# Patient Record
Sex: MALE | Race: WHITE | URBAN - METROPOLITAN AREA
[De-identification: names, ages, dates, MRNs, and addresses within clinical notes are randomized per-mention and may not be internally consistent; named-entity substitution may affect disease eponyms.]

---

## 2017-01-03 ENCOUNTER — TELEPHONE (OUTPATIENT)
Dept: FAMILY MEDICINE | Facility: CLINIC | Age: 62
End: 2017-01-03

## 2017-01-03 NOTE — TELEPHONE ENCOUNTER
Called patient to notify him of valid GI referral written 3/23/16 and that he can call to schedule an appointment with them.   Unable to reach, left a VM to call back to RN triage line.     Chelo Vides RN  [facility]

## 2017-01-03 NOTE — TELEPHONE ENCOUNTER
Reason for Call: Request for an order or referral:    Referral being requested: [facility]    Date needed: at your convenience    Has the patient been seen by the PCP for this problem? YES    Additional comments: Patient states at last physical he was given referral but he always has to wait until work slows down in January through February. Can you resubmit referral so he can call to schedule.    Phone number Patient can be reached at:  Home number on file [phone] (home)    Best Time:  anytime    Can we leave a detailed message on this number?  YES    Call taken on 1/3/2017 at 8:22 AM by Clara Chaney

## 2017-01-03 NOTE — TELEPHONE ENCOUNTER
Patient called back. RN notified him that it was ok to call P GI as the referral should still be good.   Patient stated understanding.     Chelo Vides RN  [facility]

## 2017-01-05 DIAGNOSIS — B18.2 CHRONIC HEPATITIS C WITHOUT HEPATIC COMA (H): Primary | ICD-10-CM

## 2017-01-10 ENCOUNTER — OFFICE VISIT (OUTPATIENT)
Dept: GASTROENTEROLOGY | Facility: CLINIC | Age: 62
End: 2017-01-10
Attending: PHYSICIAN ASSISTANT
Payer: COMMERCIAL

## 2017-01-10 VITALS
WEIGHT: 180.3 LBS | SYSTOLIC BLOOD PRESSURE: 173 MMHG | DIASTOLIC BLOOD PRESSURE: 99 MMHG | TEMPERATURE: 97.7 F | BODY MASS INDEX: 25.81 KG/M2 | HEIGHT: 70 IN | OXYGEN SATURATION: 96 % | HEART RATE: 69 BPM

## 2017-01-10 DIAGNOSIS — B18.2 CHRONIC HEPATITIS C WITHOUT HEPATIC COMA (H): Primary | ICD-10-CM

## 2017-01-10 DIAGNOSIS — B18.2 CHRONIC HEPATITIS C WITHOUT HEPATIC COMA (H): ICD-10-CM

## 2017-01-10 DIAGNOSIS — I63.9 CEREBROVASCULAR ACCIDENT (CVA), UNSPECIFIED MECHANISM (H): ICD-10-CM

## 2017-01-10 DIAGNOSIS — Z95.2 S/P AVR (AORTIC VALVE REPLACEMENT): ICD-10-CM

## 2017-01-10 LAB
ALBUMIN SERPL-MCNC: 3.9 G/DL (ref 3.4–5)
ALP SERPL-CCNC: 60 U/L (ref 40–150)
ALT SERPL W P-5'-P-CCNC: 142 U/L (ref 0–70)
ANION GAP SERPL CALCULATED.3IONS-SCNC: 6 MMOL/L (ref 3–14)
AST SERPL W P-5'-P-CCNC: 83 U/L (ref 0–45)
BILIRUB DIRECT SERPL-MCNC: 0.1 MG/DL (ref 0–0.2)
BILIRUB SERPL-MCNC: 0.3 MG/DL (ref 0.2–1.3)
BUN SERPL-MCNC: 13 MG/DL (ref 7–30)
CALCIUM SERPL-MCNC: 10.1 MG/DL (ref 8.5–10.1)
CHLORIDE SERPL-SCNC: 105 MMOL/L (ref 94–109)
CO2 SERPL-SCNC: 28 MMOL/L (ref 20–32)
CREAT SERPL-MCNC: 0.89 MG/DL (ref 0.66–1.25)
ERYTHROCYTE [DISTWIDTH] IN BLOOD BY AUTOMATED COUNT: 13.1 % (ref 10–15)
GFR SERPL CREATININE-BSD FRML MDRD: 86 ML/MIN/1.7M2
GLUCOSE SERPL-MCNC: 97 MG/DL (ref 70–99)
HAV IGG SER QL IA: ABNORMAL
HBV CORE AB SERPL QL IA: NONREACTIVE
HBV SURFACE AB SERPL IA-ACNC: 17.35 M[IU]/ML
HBV SURFACE AG SERPL QL IA: NONREACTIVE
HCT VFR BLD AUTO: 43.1 % (ref 40–53)
HGB BLD-MCNC: 14.6 G/DL (ref 13.3–17.7)
INR PPP: 0.99 (ref 0.86–1.14)
IRON SATN MFR SERPL: 37 % (ref 15–46)
IRON SERPL-MCNC: 144 UG/DL (ref 35–180)
MCH RBC QN AUTO: 33 PG (ref 26.5–33)
MCHC RBC AUTO-ENTMCNC: 33.9 G/DL (ref 31.5–36.5)
MCV RBC AUTO: 98 FL (ref 78–100)
PLATELET # BLD AUTO: 223 10E9/L (ref 150–450)
POTASSIUM SERPL-SCNC: 4.4 MMOL/L (ref 3.4–5.3)
PROT SERPL-MCNC: 8.6 G/DL (ref 6.8–8.8)
RBC # BLD AUTO: 4.42 10E12/L (ref 4.4–5.9)
SODIUM SERPL-SCNC: 140 MMOL/L (ref 133–144)
TIBC SERPL-MCNC: 386 UG/DL (ref 240–430)
WBC # BLD AUTO: 6 10E9/L (ref 4–11)

## 2017-01-10 PROCEDURE — 86706 HEP B SURFACE ANTIBODY: CPT | Performed by: PHYSICIAN ASSISTANT

## 2017-01-10 PROCEDURE — 87340 HEPATITIS B SURFACE AG IA: CPT | Performed by: PHYSICIAN ASSISTANT

## 2017-01-10 PROCEDURE — 83550 IRON BINDING TEST: CPT | Performed by: PHYSICIAN ASSISTANT

## 2017-01-10 PROCEDURE — 80048 BASIC METABOLIC PNL TOTAL CA: CPT | Performed by: PHYSICIAN ASSISTANT

## 2017-01-10 PROCEDURE — 85027 COMPLETE CBC AUTOMATED: CPT | Performed by: PHYSICIAN ASSISTANT

## 2017-01-10 PROCEDURE — 86704 HEP B CORE ANTIBODY TOTAL: CPT | Performed by: PHYSICIAN ASSISTANT

## 2017-01-10 PROCEDURE — 86708 HEPATITIS A ANTIBODY: CPT | Performed by: PHYSICIAN ASSISTANT

## 2017-01-10 PROCEDURE — 87522 HEPATITIS C REVRS TRNSCRPJ: CPT | Performed by: PHYSICIAN ASSISTANT

## 2017-01-10 PROCEDURE — 80076 HEPATIC FUNCTION PANEL: CPT | Performed by: PHYSICIAN ASSISTANT

## 2017-01-10 PROCEDURE — 83540 ASSAY OF IRON: CPT | Performed by: PHYSICIAN ASSISTANT

## 2017-01-10 PROCEDURE — 87902 NFCT AGT GNTYP ALYS HEP C: CPT | Performed by: PHYSICIAN ASSISTANT

## 2017-01-10 PROCEDURE — 99212 OFFICE O/P EST SF 10 MIN: CPT | Mod: ZF

## 2017-01-10 PROCEDURE — 85610 PROTHROMBIN TIME: CPT | Performed by: PHYSICIAN ASSISTANT

## 2017-01-10 PROCEDURE — 36415 COLL VENOUS BLD VENIPUNCTURE: CPT | Performed by: PHYSICIAN ASSISTANT

## 2017-01-10 ASSESSMENT — PAIN SCALES - GENERAL: PAINLEVEL: NO PAIN (0)

## 2017-01-10 NOTE — PROGRESS NOTES
"Division of Gastroenterology, Hepatology and Nutrition Department of Medicine       Assessment/Plan:  Chronic Hepatitis C,  genotype pending         Marlo Cottrell  has chronic hepatitis C.  He has normal liver synthetic function labs including PLTs of 223  There is no stigmata of chronic liver disease.  His HCV genotype, quant,  and labs for Hepatitis A and B serology are pending.  The iron saturation is normal.  He  will schedule an US and Fibrosis Scan.   I will see the patient afterwards to discuss the results and start the Hepatitis C treatment process.       Thank you for allowing me to participate in the care of this patient.  If you have any questions regarding this visit and plan of care, please do not hesitate to page me at [phone].        Jodi Diana MS, PA-C  Division of Gastroenterology, Hepatology and Nutrition      HPI:    Marlo Cottrell is a pleasant 61 year old  male  who presents for consultation regarding chronic hepatitis C.  He is genotype unknown. He was diagnosed in the late 19990's.  His risk factors for hepatitis C are: IV drugs in the 1970's.  He last used in the 1970's.  He quit drinking alcohol in 2000.   He has  undergone a full  Evaluation for hepatitis C through [facility].   He  Had a liver biopsy in 2005 and doesn't know the results.  He did not go back there because his \"lung was punctured from the biopsy.\"   He also has been waiting for better treatment options.  He  is  treatment naive. This patient also has the following significant past medical history:   Patient Active Problem List   Diagnosis     Palpitations     Advanced directives, counseling/discussion     Aortic valve stenosis, severe     Hyperlipidemia LDL goal <100     Hypertension goal BP (blood pressure) < 140/90     CVA (cerebral vascular accident) (H)     S/P AVR (aortic valve replacement)     Chronic hepatitis C without hepatic coma (H)   .    The patient does not have ascites or any overt signs of " hepatic encephalopathy.  He  denies any hematemesis, hematochezia or melena.  He is negative for HIV, DM, inherited coagulation disorders, cryoglobulinemia,  chronic skin conditions, pulmonary disease (asthma, COPD, other). There is no family history of liver disease.      ROS:  10 point review of systems performed.  All pertinent positives noted in the HPI,  otherwise Negative.      Past Medical History   Diagnosis Date     Hepatitis C      Hyperlipidemia      Aortic stenosis      Walking troubles      Difficulty in walking(719.7)      Chronic infection      hepatitis C       Current Outpatient Prescriptions   Medication Sig Dispense Refill     metoprolol (LOPRESSOR) 25 MG tablet Take 0.5 tablets (12.5 mg) by mouth 2 times daily 90 tablet 3     lisinopril (PRINIVIL,ZESTRIL) 2.5 MG tablet Take 1 tablet (2.5 mg) by mouth daily 90 tablet 3     ASPIRIN 325 MG OR TBEC 1 TABLET DAILY         No Known Allergies    Family History   Problem Relation Age of Onset     CANCER Mother      lung     Alzheimer Disease Paternal Grandfather      CANCER Brother      maybe lung     DIABETES No family hx of      C.A.D. No family hx of      CEREBROVASCULAR DISEASE No family hx of        Social History     Social History     Marital Status: Single     Spouse Name: N/A     Number of Children: 1     Years of Education: N/A     Occupational History            [employer], shipping/ receiving     Social History Main Topics     Smoking status: Former Smoker     Types: Cigarettes     Quit date: 01/01/2005     Smokeless tobacco: Never Used      Comment: non-smoking household     Alcohol Use: No      Comment: none since 2000     Drug Use: No      Comment: never     Sexual Activity:     Partners: Female     Other Topics Concern     Parent/Sibling W/ Cabg, Mi Or Angioplasty Before 65f 55m? No     Social History Narrative       OBJECTIVE  Vitals: Blood pressure 173/99, pulse 69, temperature 97.7  F (36.5  C), temperature source Oral, height 1.778 m  "(5' 10\"), weight 81.784 kg (180 lb 4.8 oz), SpO2 96 %. Body mass index is 25.87 kg/(m^2).  Gen: No acute distress, well nourished  Head: Normocephalic atraumatic  Eyes: Sclera anicteric  Respiratory: Clear to auscultation bilaterally, no overt wheezing or rales  CV: RRR with overt murmur  Abdomen: Soft, nontender, nondistended, normal bowel sounds  Without appreciable hepatosplenomegaly or masses  Extrem: No edema  Skin: No jaundice, spider angiomata or palmar erythema.  No rashes.   Neuro: Alert and oriented.   MSK: Grossly Intact  Psych: Normal speech, normal affect    Recent Laboratory Test Results  Lab Results   Component Value Date    ALBUMIN 3.9 01/10/2017    BILITOTAL 0.3 01/10/2017    ALKPHOS 60 01/10/2017    AST 83* 01/10/2017    * 01/10/2017    WBC 6.0 01/10/2017    ANEU 2.9 03/23/2016    HGB 14.6 01/10/2017     01/10/2017    INR 0.99 01/10/2017    TRIG 71 03/23/2016    CR 0.89 01/10/2017    TSH 3.34 04/29/2015               "

## 2017-01-10 NOTE — NURSING NOTE
Chief Complaint   Patient presents with     Consult     Hepatitis C   Pt roomed, vitals, meds, and allergies reviewed with pt. Pt ready for provider.  Macho Ulloa, CMA

## 2017-01-10 NOTE — Clinical Note
"1/10/2017       RE: Marlo Cottrell  [address] [facility] [address]     Dear Colleague,    Thank you for referring your patient, Marlo Cottrell, to the [facility] HEPATOLOGY at [facility]. Please see a copy of my visit note below.    Division of Gastroenterology, Hepatology and Nutrition Department of Medicine       Assessment/Plan:  Chronic Hepatitis C,  genotype pending         Marlo Cottrell  has chronic hepatitis C.  He has normal liver synthetic function labs including PLTs of 223  There is no stigmata of chronic liver disease.  His HCV genotype, quant,  and labs for Hepatitis A and B serology are pending.  The iron saturation is normal.  He  will schedule an US and Fibrosis Scan.   I will see the patient afterwards to discuss the results and start the Hepatitis C treatment process.       Thank you for allowing me to participate in the care of this patient.  If you have any questions regarding this visit and plan of care, please do not hesitate to page me at [phone].        Jodi Diana MS, PA-C  Division of Gastroenterology, Hepatology and Nutrition      HPI:    Marlo Cottrell is a pleasant 61 year old  male  who presents for consultation regarding chronic hepatitis C.  He is genotype unknown. He was diagnosed in the late 19990's.  His risk factors for hepatitis C are: IV drugs in the 1970's.  He last used in the 1970's.  He quit drinking alcohol in 2000.   He has  undergone a full  Evaluation for hepatitis C through [facility].   He  Had a liver biopsy in 2005 and doesn't know the results.  He did not go back there because his \"lung was punctured from the biopsy.\"   He also has been waiting for better treatment options.  He  is  treatment naive. This patient also has the following significant past medical history:   Patient Active Problem List   Diagnosis     Palpitations     Advanced directives, counseling/discussion     Aortic valve stenosis, severe "     Hyperlipidemia LDL goal <100     Hypertension goal BP (blood pressure) < 140/90     CVA (cerebral vascular accident) (H)     S/P AVR (aortic valve replacement)     Chronic hepatitis C without hepatic coma (H)   .    The patient does not have ascites or any overt signs of hepatic encephalopathy.  He  denies any hematemesis, hematochezia or melena.  He is negative for HIV, DM, inherited coagulation disorders, cryoglobulinemia,  chronic skin conditions, pulmonary disease (asthma, COPD, other). There is no family history of liver disease.      ROS:  10 point review of systems performed.  All pertinent positives noted in the HPI,  otherwise Negative.      Past Medical History   Diagnosis Date     Hepatitis C      Hyperlipidemia      Aortic stenosis      Walking troubles      Difficulty in walking(719.7)      Chronic infection      hepatitis C       Current Outpatient Prescriptions   Medication Sig Dispense Refill     metoprolol (LOPRESSOR) 25 MG tablet Take 0.5 tablets (12.5 mg) by mouth 2 times daily 90 tablet 3     lisinopril (PRINIVIL,ZESTRIL) 2.5 MG tablet Take 1 tablet (2.5 mg) by mouth daily 90 tablet 3     ASPIRIN 325 MG OR TBEC 1 TABLET DAILY         No Known Allergies    Family History   Problem Relation Age of Onset     CANCER Mother      lung     Alzheimer Disease Paternal Grandfather      CANCER Brother      maybe lung     DIABETES No family hx of      C.A.D. No family hx of      CEREBROVASCULAR DISEASE No family hx of        Social History     Social History     Marital Status: Single     Spouse Name: N/A     Number of Children: 1     Years of Education: N/A     Occupational History            [employer], shipping/ receiving     Social History Main Topics     Smoking status: Former Smoker     Types: Cigarettes     Quit date: 01/01/2005     Smokeless tobacco: Never Used      Comment: non-smoking household     Alcohol Use: No      Comment: none since 2000     Drug Use: No      Comment: never     Sexual  "Activity:     Partners: Female     Other Topics Concern     Parent/Sibling W/ Cabg, Mi Or Angioplasty Before 65f 55m? No     Social History Narrative       OBJECTIVE  Vitals: Blood pressure 173/99, pulse 69, temperature 97.7  F (36.5  C), temperature source Oral, height 1.778 m (5' 10\"), weight 81.784 kg (180 lb 4.8 oz), SpO2 96 %. Body mass index is 25.87 kg/(m^2).  Gen: No acute distress, well nourished  Head: Normocephalic atraumatic  Eyes: Sclera anicteric  Respiratory: Clear to auscultation bilaterally, no overt wheezing or rales  CV: RRR with overt murmur  Abdomen: Soft, nontender, nondistended, normal bowel sounds  Without appreciable hepatosplenomegaly or masses  Extrem: No edema  Skin: No jaundice, spider angiomata or palmar erythema.  No rashes.   Neuro: Alert and oriented.   MSK: Grossly Intact  Psych: Normal speech, normal affect    Recent Laboratory Test Results  Lab Results   Component Value Date    ALBUMIN 3.9 01/10/2017    BILITOTAL 0.3 01/10/2017    ALKPHOS 60 01/10/2017    AST 83* 01/10/2017    * 01/10/2017    WBC 6.0 01/10/2017    ANEU 2.9 03/23/2016    HGB 14.6 01/10/2017     01/10/2017    INR 0.99 01/10/2017    TRIG 71 03/23/2016    CR 0.89 01/10/2017    TSH 3.34 04/29/2015       Again, thank you for allowing me to participate in the care of your patient.      Sincerely,    Jodi Diana PA-C      "

## 2017-01-10 NOTE — MR AVS SNAPSHOT
After Visit Summary   1/10/2017    Marlo Cottrell    MRN: [id number]           Patient Information     Date Of Birth          1955        Visit Information        Provider Department      1/10/2017 1:00 PM Jodi Diana PA-C [facility] Hepatology        Today's Diagnoses     Chronic hepatitis C without hepatic coma (H)    -  1     S/P AVR (aortic valve replacement)         Cerebrovascular accident (CVA), unspecified mechanism (H)            Follow-ups after your visit        Your next 10 appointments already scheduled     Feb 01, 2017 10:30 AM   US ABDOMEN COMPLETE with UCUS2   [facility] US ([facility])    [address] [place] [id number] [phone]           Please bring a list of your medicines (including vitamins, minerals and over-the-counter drugs). Also, tell your doctor about any allergies you may have. Wear comfortable clothes and leave your valuables at home.  Adults: No eating or drinking for 8 hours before the exam. You may take medicine with a small sip of water.  Children: - Children 6+ years: No food or drink for 6 hours before exam. - Children 1-5 years: No food or drink for 4 hours before exam. - Infants, breast-fed: may have breast milk up to 2 hours before exam. - Infants, formula: may have bottle until 4 hours before exam.  Please call the Imaging Department at your exam site with any questions.            Feb 01, 2017 11:00 AM   (Arrive by 10:45 AM)   FIBROSIS SCAN with DEVI Mendoza [facility] Hepatology ([facility])    [address] [place] [address] [phone]            Feb 01, 2017 12:30 PM   (Arrive by 12:15 PM)   FIBROSIS SCAN READ with DEVI Mendoza [facility] Hepatology ([facility])    [address] [place] [address] [phone]             "  Future tests that were ordered for you today     Open Future Orders        Priority Expected Expires Ordered    US Abdomen Complete Routine  7/9/2018 1/10/2017    Fibrosis Scan (In-Clinic) Routine  1/10/2018 1/10/2017            Who to contact     If you have questions or need follow up information about today's clinic visit or your schedule please contact [facility] HEPATOLOGY directly at [phone].  Normal or non-critical lab and imaging results will be communicated to you by [employer], letter or phone within 4 business days after the clinic has received the results. If you do not hear from us within 7 days, please contact the clinic through [employer] or phone. If you have a critical or abnormal lab result, we will notify you by phone as soon as possible.  Submit refill requests through [employer] or call your pharmacy and they will forward the refill request to us. Please allow 3 business days for your refill to be completed.          Additional Information About Your Visit        [employer] Information     [employer] gives you secure access to your electronic health record. If you see a primary care provider, you can also send messages to your care team and make appointments. If you have questions, please call your primary care clinic.  If you do not have a primary care provider, please call [phone] and they will assist you.        Care EveryWhere ID     This is your Care EveryWhere ID. This could be used by other organizations to access your [place] medical records  [id number]        Your Vitals Were     Pulse Temperature Height BMI (Body Mass Index) Pulse Oximetry       69 97.7  F (36.5  C) (Oral) 1.778 m (5' 10\") 25.87 kg/m2 96%        Blood Pressure from Last 3 Encounters:   01/10/17 173/99   09/08/16 154/78   03/23/16 134/76    Weight from Last 3 Encounters:   01/10/17 81.784 kg (180 lb 4.8 oz)   09/08/16 79.425 kg (175 lb 1.6 oz)   04/22/16 82.555 kg (182 lb)               Primary Care Provider Office Phone " # Fax #    Quang Morgan [phone]       [facility] [address] [facility] [address]        Thank you!     Thank you for choosing [facility] HEPATOLOGY  for your care. Our goal is always to provide you with excellent care. Hearing back from our patients is one way we can continue to improve our services. Please take a few minutes to complete the written survey that you may receive in the mail after your visit with us. Thank you!             Your Updated Medication List - Protect others around you: Learn how to safely use, store and throw away your medicines at www.disposemymeds.org.          This list is accurate as of: 1/10/17  1:36 PM.  Always use your most recent med list.                   Brand Name Dispense Instructions for use    aspirin 325 MG EC tablet      1 TABLET DAILY       lisinopril 2.5 MG tablet    PRINIVIL/Zestril    90 tablet    Take 1 tablet (2.5 mg) by mouth daily       metoprolol 25 MG tablet    LOPRESSOR    90 tablet    Take 0.5 tablets (12.5 mg) by mouth 2 times daily

## 2017-01-12 LAB
HCV RNA SERPL NAA+PROBE-ACNC: ABNORMAL [IU]/ML
HCV RNA SERPL NAA+PROBE-LOG IU: 5.9 LOG IU/ML

## 2017-01-14 LAB — HCV GENTYP SERPL NAA+PROBE: NORMAL

## 2017-02-01 ENCOUNTER — OFFICE VISIT (OUTPATIENT)
Dept: GASTROENTEROLOGY | Facility: CLINIC | Age: 62
End: 2017-02-01
Attending: PHYSICIAN ASSISTANT
Payer: COMMERCIAL

## 2017-02-01 VITALS
BODY MASS INDEX: 25.77 KG/M2 | HEIGHT: 70 IN | DIASTOLIC BLOOD PRESSURE: 89 MMHG | OXYGEN SATURATION: 97 % | HEART RATE: 85 BPM | WEIGHT: 180 LBS | SYSTOLIC BLOOD PRESSURE: 144 MMHG | TEMPERATURE: 98.2 F

## 2017-02-01 DIAGNOSIS — B18.2 CHRONIC HEPATITIS C WITHOUT HEPATIC COMA (H): ICD-10-CM

## 2017-02-01 DIAGNOSIS — B18.2 CHRONIC HEPATITIS C WITHOUT HEPATIC COMA (H): Primary | ICD-10-CM

## 2017-02-01 PROCEDURE — 99212 OFFICE O/P EST SF 10 MIN: CPT | Mod: ZF

## 2017-02-01 PROCEDURE — 91200 LIVER ELASTOGRAPHY: CPT | Mod: ZF | Performed by: PHYSICIAN ASSISTANT

## 2017-02-01 ASSESSMENT — PAIN SCALES - GENERAL: PAINLEVEL: NO PAIN (0)

## 2017-02-01 NOTE — Clinical Note
"2/1/2017      RE: Marlo Cottrell  [address] [facility] [address]       Division of Gastroenterology, Hepatology and Nutrition Department of Medicine     Assessment/Plan  Chronic Hepatitis C,  genotype 1a Stage 2 fibrosis (Fibrosis scan 2/1/17)         Marlo Cottrell  has chronic hepatitis C.  He had a fibrosis scan today that shows he has stage 2 fibrosis.  The US today shows,\" The liver demonstrates normal homogeneous echotexture. No evidence of a focal hepatic mass.  There is a tiny echogenic focus in the gallbladder most consistent with focal adenomyomatosis.\"    There is no family history of gallbladder cancer and Mr. Cottrell denies any abdominal pain, weight loss.    It is imperative to treat the Hepatitis C.  Mr. Cottrell would like to participate in the Prioritize Study.  I will see him  back 4 weeks after he  has started the medication.  He  will be randomized to either Harvoni or Zepatier.        Thank you for allowing me to participate in the care of this patient.  If you have any questions regarding this visit and plan of care, please do not hesitate to page me at [phone].    Jodi Diana, MS, PA-C  Division of Gastroenterology, Hepatology and Nutrition      HPI  Marlo Cottrell is a pleasant 61 year old  male  who presents for consultation regarding chronic hepatitis C.  He is genotype unknown. He was diagnosed in the late 19990's.  His risk factors for hepatitis C are: IV drugs in the 1970's.  He last used in the 1970's.  He quit drinking alcohol in 2000.   He has  undergone a full  Evaluation for hepatitis C through [facility].   He  Had a liver biopsy in 2005 and doesn't know the results.  He did not go back there because his \"lung was punctured from the biopsy.\"   He also has been waiting for better treatment options.  He  is  treatment naive. This patient also has the following significant past medical history:   Patient Active Problem List   Diagnosis     Palpitations     Advanced " "directives, counseling/discussion     Aortic valve stenosis, severe     Hyperlipidemia LDL goal <100     Hypertension goal BP (blood pressure) < 140/90     CVA (cerebral vascular accident) (H)     S/P AVR (aortic valve replacement)     Chronic hepatitis C without hepatic coma (H)         ROS:  Comprehensive review of systems is negative, unless otherwise noted above    Past Medical History   Diagnosis Date     Hepatitis C      Hyperlipidemia      Aortic stenosis      Walking troubles      Difficulty in walking(719.7)      Chronic infection      hepatitis C       Current Outpatient Prescriptions   Medication Sig Dispense Refill     metoprolol (LOPRESSOR) 25 MG tablet Take 0.5 tablets (12.5 mg) by mouth 2 times daily 90 tablet 3     lisinopril (PRINIVIL,ZESTRIL) 2.5 MG tablet Take 1 tablet (2.5 mg) by mouth daily 90 tablet 3     ASPIRIN 325 MG OR TBEC 1 TABLET DAILY         No Known Allergies    Family History   Problem Relation Age of Onset     CANCER Mother      lung     Alzheimer Disease Paternal Grandfather      CANCER Brother      maybe lung     DIABETES No family hx of      C.A.D. No family hx of      CEREBROVASCULAR DISEASE No family hx of        Social History     Social History     Marital Status: Single     Spouse Name: N/A     Number of Children: 1     Years of Education: N/A     Occupational History            [employer], [employer]/ receiving     Social History Main Topics     Smoking status: Former Smoker     Types: Cigarettes     Quit date: 01/01/2005     Smokeless tobacco: Never Used      Comment: non-smoking household     Alcohol Use: No      Comment: none since 2000     Drug Use: No      Comment: never     Sexual Activity:     Partners: Female     Other Topics Concern     Parent/Sibling W/ Cabg, Mi Or Angioplasty Before 65f 55m? No     Social History Narrative       OBJECTIVE  Vitals: Blood pressure 144/89, pulse 85, temperature 98.2  F (36.8  C), temperature source Oral, height 1.778 m (5' 10\"), weight " 81.647 kg (180 lb), SpO2 97 %. Body mass index is 25.83 kg/(m^2).  Gen: No acute distress, well nourished  Eyes: Sclera anicteric  Respiratory: Clear to auscultation bilaterally, no overt wheezing or rales  CV: RRR without overt murmur  Skin: No rash; no jaundice  Psych: Normal speech, normal affect    Recent Laboratory Test Results  Lab Results   Component Value Date    WBC 6.0 01/10/2017    HGB 14.6 01/10/2017    HCT 43.1 01/10/2017     01/10/2017    CHOL 134 03/23/2016    TRIG 71 03/23/2016    HDL 26* 03/23/2016    * 01/10/2017    AST 83* 01/10/2017     01/10/2017    BUN 13 01/10/2017    CO2 28 01/10/2017    TSH 3.34 04/29/2015    PSA 0.20 04/29/2015    INR 0.99 01/10/2017       Jodi Diana PA-C

## 2017-02-01 NOTE — PROGRESS NOTES
"Division of Gastroenterology, Hepatology and Nutrition Department of Medicine     Assessment/Plan  Chronic Hepatitis C,  genotype 1a Stage 2 fibrosis (Fibrosis scan 2/1/17)         Marlo Cottrell  has chronic hepatitis C.  He had a fibrosis scan today that shows he has stage 2 fibrosis.  The US today shows,\" The liver demonstrates normal homogeneous echotexture. No evidence of a focal hepatic mass.  There is a tiny echogenic focus in the gallbladder most consistent with focal adenomyomatosis.\"    There is no family history of gallbladder cancer and Mr. Cottrell denies any abdominal pain, weight loss.    It is imperative to treat the Hepatitis C.  Mr. Cottrell would like to participate in the Prioritize Study.  I will see him  back 4 weeks after he  has started the medication.  He  will be randomized to either Harvoni or Zepatier.        Thank you for allowing me to participate in the care of this patient.  If you have any questions regarding this visit and plan of care, please do not hesitate to page me at [phone].    Jodi Diana MS, PA-C  Division of Gastroenterology, Hepatology and Nutrition      HPI  Marlo Cottrell is a pleasant 61 year old  male  who presents for consultation regarding chronic hepatitis C.  He is genotype unknown. He was diagnosed in the late 19990's.  His risk factors for hepatitis C are: IV drugs in the 1970's.  He last used in the 1970's.  He quit drinking alcohol in 2000.   He has  undergone a full  Evaluation for hepatitis C through [facility].   He  Had a liver biopsy in 2005 and doesn't know the results.  He did not go back there because his \"lung was punctured from the biopsy.\"   He also has been waiting for better treatment options.  He  is  treatment naive. This patient also has the following significant past medical history:   Patient Active Problem List   Diagnosis     Palpitations     Advanced directives, counseling/discussion     Aortic valve stenosis, severe     " "Hyperlipidemia LDL goal <100     Hypertension goal BP (blood pressure) < 140/90     CVA (cerebral vascular accident) (H)     S/P AVR (aortic valve replacement)     Chronic hepatitis C without hepatic coma (H)         ROS:  Comprehensive review of systems is negative, unless otherwise noted above    Past Medical History   Diagnosis Date     Hepatitis C      Hyperlipidemia      Aortic stenosis      Walking troubles      Difficulty in walking(719.7)      Chronic infection      hepatitis C       Current Outpatient Prescriptions   Medication Sig Dispense Refill     metoprolol (LOPRESSOR) 25 MG tablet Take 0.5 tablets (12.5 mg) by mouth 2 times daily 90 tablet 3     lisinopril (PRINIVIL,ZESTRIL) 2.5 MG tablet Take 1 tablet (2.5 mg) by mouth daily 90 tablet 3     ASPIRIN 325 MG OR TBEC 1 TABLET DAILY         No Known Allergies    Family History   Problem Relation Age of Onset     CANCER Mother      lung     Alzheimer Disease Paternal Grandfather      CANCER Brother      maybe lung     DIABETES No family hx of      C.A.D. No family hx of      CEREBROVASCULAR DISEASE No family hx of        Social History     Social History     Marital Status: Single     Spouse Name: N/A     Number of Children: 1     Years of Education: N/A     Occupational History            [employer], [employer]/ receiving     Social History Main Topics     Smoking status: Former Smoker     Types: Cigarettes     Quit date: 01/01/2005     Smokeless tobacco: Never Used      Comment: non-smoking household     Alcohol Use: No      Comment: none since 2000     Drug Use: No      Comment: never     Sexual Activity:     Partners: Female     Other Topics Concern     Parent/Sibling W/ Cabg, Mi Or Angioplasty Before 65f 55m? No     Social History Narrative       OBJECTIVE  Vitals: Blood pressure 144/89, pulse 85, temperature 98.2  F (36.8  C), temperature source Oral, height 1.778 m (5' 10\"), weight 81.647 kg (180 lb), SpO2 97 %. Body mass index is 25.83 kg/(m^2).  Gen: " No acute distress, well nourished  Eyes: Sclera anicteric  Respiratory: Clear to auscultation bilaterally, no overt wheezing or rales  CV: RRR without overt murmur  Skin: No rash; no jaundice  Psych: Normal speech, normal affect    Recent Laboratory Test Results  Lab Results   Component Value Date    WBC 6.0 01/10/2017    HGB 14.6 01/10/2017    HCT 43.1 01/10/2017     01/10/2017    CHOL 134 03/23/2016    TRIG 71 03/23/2016    HDL 26* 03/23/2016    * 01/10/2017    AST 83* 01/10/2017     01/10/2017    BUN 13 01/10/2017    CO2 28 01/10/2017    TSH 3.34 04/29/2015    PSA 0.20 04/29/2015    INR 0.99 01/10/2017

## 2017-03-03 ENCOUNTER — CARE COORDINATION (OUTPATIENT)
Dept: GASTROENTEROLOGY | Facility: CLINIC | Age: 62
End: 2017-03-03

## 2017-03-03 DIAGNOSIS — B18.2 CHRONIC HEPATITIS C WITHOUT HEPATIC COMA (H): Primary | ICD-10-CM

## 2017-03-03 NOTE — PROGRESS NOTES
3/3/2017  1:14 PM    Hep C Care Coordination Call   Connected with patient for f/u on Hep C treatment start. Patient will also be on the prioritized study. Patient was randomized for Harvoni x 12 weeks. Patient did start treatment on 2/27/17. Patient reports no changes in health, no new prescribed medications and has taken the medication as prescribed. Patient states he has not spoken with a pharmacist and would like to speak with one regarding his bp medications. I did set up an appt for patient with Chito GUTIERREZ PharmD on 3/7/17 at 1pm. Reviewed the following information with patient;       Below is a summary of your treatment schedule. Please follow the schedule as closely as possible. Labs should be drawn as close to the date indicated at the [facility] or [facility].    Hepatitis C Treatment  Treatment: Harvoni x 12 weeks  Treatment Naive, GT 1a, F2       Start Date: 02/27/17    Week 4  Hepatic panel, bmp Lab Due: 3/27/2017  Office Visit Date:  With Jodi Diana PA-C on 3/28/17 at 11:00am at the [facility] located at [address] [place] [address] on the 3rd floor. Please arrive at 10:00am to have your labs drawn on first floor of the same building. You do not need to fast for these labs.     Week 12 - End of Treatment  HCV RNA Quant Lab Due: 5/22/2017. Patient will have this lab drawn at the [facility] at 10:00am on the first floor. You do not need to fast for this lab.      3 Months Post Treatment  HCV RNA Quant Lab Due: 8/21/2017. Patient will have this lab drawn at the [facility] at 10:00am on the first floor. You do not need to fast for this lab.        Educational information to patient on Hep C treatment;     -Contact the [facility] Hepatology clinic and speak with RN Care Coordinator prior to starting any new prescribed or OTC medications.   -Take medications exactly as prescribed, do not change dose or stop taking without consulting your provider.   -Take Medication one time each  day with or without food  -If you miss a dose of medication, then take it as soon as you remember on the same day. If not remembered on the same day, then skip the dose and take the next dose at the usual time. Do not take more than the recommended dose. Contact the clinic if you miss a dose.    Please contact the pharmacy 1-2 weeks prior to needing a medication refill.      Side Effects  The most common side effects of Hep C medication treatment can include:  -tiredness  -headache  Notify the clinic of any side effects that bother you or that do not go away.   Possible side effects have been discussed.   Patient has been instructed to clinic for rash, itching or unmanageable nausea.    How to store Hep C Treatment Medications  -Store Medication at room temperature below 86 degrees F  -Keep Medication in it's original container  -Do not use Medication if the seal is broken or missing    General information  It is not known if treatment will prevent you from infecting another person or reinfecting yourself with the hepatitis C virus during treatment. It is best that as soon as you start treatment to buy a new toothbrush, disposable razors (if you use a rotating shaver you do not need to buy a new one) and nail clippers. If you check your blood sugar at home, please dispose of the fingerstick needle after each use and DO NOT REUSE the insulin needles. These items should not be shared with anyone.        If you have any questions, please contact the main clinic at [phone] or your RN Care Coordinator at [phone]. We appreciate you choosing the [facility] clinic for your treatment. Patient agrees to Hep C treatment POC and verbalizes understanding. Patient will receive a copy of treatment plan in the mail, address verified with patient. Patient has no further questions or concerns. Hep C care team updated on patient status.            Shannan Mendoza RN, BSN, PHN  [facility]  Care Coordinator for Hepatology Clinic/Specialty Program

## 2017-03-07 ENCOUNTER — ALLIED HEALTH/NURSE VISIT (OUTPATIENT)
Dept: PHARMACY | Facility: CLINIC | Age: 62
End: 2017-03-07
Payer: COMMERCIAL

## 2017-03-07 DIAGNOSIS — B18.2 CHRONIC HEPATITIS C WITHOUT HEPATIC COMA (H): Primary | ICD-10-CM

## 2017-03-07 PROCEDURE — 99207 ZZC NO CHARGE LOS: CPT | Performed by: PHARMACIST

## 2017-03-07 RX ORDER — CALCIUM CARBONATE 500 MG/1
1 TABLET, CHEWABLE ORAL DAILY PRN
COMMUNITY

## 2017-03-07 NOTE — MR AVS SNAPSHOT
After Visit Summary   3/7/2017    Marlo Cottrell    MRN: [id number]           Patient Information     Date Of Birth          1955        Visit Information        Provider Department      3/7/2017 1:00 PM Chito Fisher, [facility] Medication Therapy Management        Today's Diagnoses     Chronic hepatitis C without hepatic coma (H)    -  1      Care Instructions    Recommendations from today's MT visit:                                                      Concerning Harvoni Therapy:   -You will be taking Harvoni, 1 tablet daily for 12 weeks with or without food.   -Do NOT take heartburn/reflux medications while on Harvoni without discussing with us first   -If you start any new medications, please call to discuss drug interactions with me before starting   -If you miss a dose, and it has been less than 12 hours, you may take the dose. If it has been more, skip the dose and take your next dose as normal. Do not take 2 doses at the same time.    -Most common side effects are Headache and Fatigue.    Next  visit: as needed    To schedule another MTM appointment, please call the clinic directly or you may call the MTM scheduling line at [phone] or toll-free at [phone].     My Clinical Pharmacist's contact information:                                                      It was a pleasure seeing you today!  Please feel free to contact me with any questions or concerns you have.      Chito Fisher, PharmD  MT Pharmacist    Phone: [phone]     You may receive a survey about the MT services you received.  I would appreciate your feedback to help me serve you better in the future. Please fill it out and return it when you can. Your comments will be anonymous.            Follow-ups after your visit        Your next 10 appointments already scheduled     Mar 28, 2017 10:00 AM CDT   LAB with [facility] ([facility])    [address] [place] [address]  [facility] [id number] [phone]           Patient must bring picture ID.  Patient should be prepared to give a urine specimen  Please do not eat 10-12 hours before your appointment if you are coming in fasting for labs on lipids, cholesterol, or glucose (sugar).  Pregnant women should follow their Care Team instructions. Water with medications is okay. Do not drink coffee or other fluids.   If you have concerns about taking  your medications, please ask at office or if scheduling via [employer], send a message by clicking on Secure Messaging, Message Your Care Team.            Mar 28, 2017 11:00 AM CDT   (Arrive by 10:45 AM)   Return General Liver with Jodi Diana PA-C   [facility] ([facility])    [address] [place] [id number]   [phone]            May 22, 2017 10:00 AM CDT   LAB with  LAB   [facility] ([facility])    [address] [place] [id number] [phone]           Patient must bring picture ID.  Patient should be prepared to give a urine specimen  Please do not eat 10-12 hours before your appointment if you are coming in fasting for labs on lipids, cholesterol, or glucose (sugar).  Pregnant women should follow their Care Team instructions. Water with medications is okay. Do not drink coffee or other fluids.   If you have concerns about taking  your medications, please ask at office or if scheduling via [employer], send a message by clicking on Secure Messaging, Message Your Care Team.            Aug 21, 2017 10:00 AM CDT   LAB with  LAB    Health Lab ([facility])    [address] [place] [id number] [phone]           Patient must bring picture ID.  Patient should be prepared to give a urine specimen  Please do not eat 10-12 hours before your appointment if you are coming in fasting for labs on lipids,  cholesterol, or glucose (sugar).  Pregnant women should follow their Care Team instructions. Water with medications is okay. Do not drink coffee or other fluids.   If you have concerns about taking  your medications, please ask at office or if scheduling via [employer], send a message by clicking on Secure Messaging, Message Your Care Team.              Who to contact     If you have questions or need follow up information about today's clinic visit or your schedule please contact [facility] MEDICATION THERAPY MANAGEMENT directly at No information on file..  Normal or non-critical lab and imaging results will be communicated to you by [employer], letter or phone within 4 business days after the clinic has received the results. If you do not hear from us within 7 days, please contact the clinic through [employer] or phone. If you have a critical or abnormal lab result, we will notify you by phone as soon as possible.  Submit refill requests through [employer] or call your pharmacy and they will forward the refill request to us. Please allow 3 business days for your refill to be completed.          Additional Information About Your Visit        [employer] Information     [employer] gives you secure access to your electronic health record. If you see a primary care provider, you can also send messages to your care team and make appointments. If you have questions, please call your primary care clinic.  If you do not have a primary care provider, please call [phone] and they will assist you.        Care EveryWhere ID     This is your Care EveryWhere ID. This could be used by other organizations to access your [facility] medical records  [id number]         Blood Pressure from Last 3 Encounters:   02/01/17 144/89   01/10/17 (!) 173/99   09/08/16 154/78    Weight from Last 3 Encounters:   02/01/17 180 lb (81.6 kg)   01/10/17 180 lb 4.8 oz (81.8 kg)   09/08/16 175 lb 1.6 oz (79.4 kg)              Today, you had the following     No orders  found for display       Primary Care Provider Office Phone # Fax #    Quang Morgan [phone]       [facility] [address] [facility] [address]        Thank you!     Thank you for choosing [facility] MEDICATION THERAPY MANAGEMENT  for your care. Our goal is always to provide you with excellent care. Hearing back from our patients is one way we can continue to improve our services. Please take a few minutes to complete the written survey that you may receive in the mail after your visit with us. Thank you!             Your Updated Medication List - Protect others around you: Learn how to safely use, store and throw away your medicines at www.disposemymeds.org.          This list is accurate as of: 3/7/17  1:18 PM.  Always use your most recent med list.                   Brand Name Dispense Instructions for use    AMOXICILLIN PO      Take 2,000 mg by mouth Before dental appointments.       aspirin 325 MG EC tablet      1 TABLET DAILY       calcium carbonate 500 MG chewable tablet    TUMS     Take 1 chew tab by mouth daily as needed       lisinopril 2.5 MG tablet    PRINIVIL/Zestril    90 tablet    Take 1 tablet (2.5 mg) by mouth daily       metoprolol 25 MG tablet    LOPRESSOR    90 tablet    Take 0.5 tablets (12.5 mg) by mouth 2 times daily

## 2017-03-07 NOTE — PATIENT INSTRUCTIONS
Recommendations from today's MTM visit:                                                      Concerning Harvoni Therapy:   -You will be taking Harvoni, 1 tablet daily for 12 weeks with or without food.   -Do NOT take heartburn/reflux medications while on Harvoni without discussing with us first   -If you start any new medications, please call to discuss drug interactions with me before starting   -If you miss a dose, and it has been less than 12 hours, you may take the dose. If it has been more, skip the dose and take your next dose as normal. Do not take 2 doses at the same time.    -Most common side effects are Headache and Fatigue.    Next  visit: as needed    To schedule another MTM appointment, please call the clinic directly or you may call the MTM scheduling line at [phone] or toll-free at [phone].     My Clinical Pharmacist's contact information:                                                      It was a pleasure seeing you today!  Please feel free to contact me with any questions or concerns you have.      Chito Fisher, Britney  MTM Pharmacist    Phone: [phone]     You may receive a survey about the MTM services you received.  I would appreciate your feedback to help me serve you better in the future. Please fill it out and return it when you can. Your comments will be anonymous.

## 2017-03-07 NOTE — PROGRESS NOTES
-[facility] Use Only -  Started taking on 2/27/17. No missed doses/ side effects. Taking at 5PM every day after work.   Genotype: 1a  Viral Load and date drawn: 700,000 on 1/1/17  Previous treatment: Treatment naive  Liver staging: F2 on 2/1/17 via fibroscan  Child Ford score: N/A  HIV positive: No  Renal impairment CrCl <30mL/min: No  Decompensated Cirrhosis: No  Recurrent HCV post-liver transplant: No  Hepatocellular Carcinoma: No   Initial Regimen: Harvoni (ledipasvir/sofosbuvir)  Length of Therapy Ordered: 12 weeks  DDIx with HCV therapy: Tums- has been  4 hours before and after.   MTM Evaluation: HCV therapy appropriate and Length of therapy appropriate Pt is appropriately avoiding Tums around dose time.     Discussed with patient:  1. Missed dose protocol  2. Common Side effects  3. DDIx with Tums.       Chito Fisher, PharmD  MTM Pharmacist    Phone: [phone]

## 2017-03-24 ENCOUNTER — OFFICE VISIT (OUTPATIENT)
Dept: FAMILY MEDICINE | Facility: CLINIC | Age: 62
End: 2017-03-24
Payer: COMMERCIAL

## 2017-03-24 VITALS
TEMPERATURE: 99.1 F | HEART RATE: 68 BPM | DIASTOLIC BLOOD PRESSURE: 80 MMHG | WEIGHT: 182 LBS | BODY MASS INDEX: 26.05 KG/M2 | SYSTOLIC BLOOD PRESSURE: 131 MMHG | HEIGHT: 70 IN

## 2017-03-24 DIAGNOSIS — Z79.2 PROPHYLACTIC ANTIBIOTIC: ICD-10-CM

## 2017-03-24 DIAGNOSIS — Z12.11 SCREEN FOR COLON CANCER: Primary | ICD-10-CM

## 2017-03-24 DIAGNOSIS — Z00.00 ROUTINE GENERAL MEDICAL EXAMINATION AT A HEALTH CARE FACILITY: ICD-10-CM

## 2017-03-24 DIAGNOSIS — I10 HYPERTENSION GOAL BP (BLOOD PRESSURE) < 140/90: ICD-10-CM

## 2017-03-24 DIAGNOSIS — Z13.1 SCREENING FOR DIABETES MELLITUS: ICD-10-CM

## 2017-03-24 DIAGNOSIS — N52.9 ERECTILE DYSFUNCTION, UNSPECIFIED ERECTILE DYSFUNCTION TYPE: ICD-10-CM

## 2017-03-24 DIAGNOSIS — Z12.5 SCREENING FOR PROSTATE CANCER: ICD-10-CM

## 2017-03-24 DIAGNOSIS — Z13.6 CARDIOVASCULAR SCREENING; LDL GOAL LESS THAN 100: ICD-10-CM

## 2017-03-24 LAB
CHOLEST SERPL-MCNC: 211 MG/DL
GLUCOSE BLD-MCNC: 140 MG/DL (ref 70–99)
HDLC SERPL-MCNC: 42 MG/DL
LDLC SERPL CALC-MCNC: 158 MG/DL
NONHDLC SERPL-MCNC: 169 MG/DL
PSA SERPL-ACNC: 0.35 UG/L (ref 0–4)
TRIGL SERPL-MCNC: 57 MG/DL

## 2017-03-24 PROCEDURE — 82947 ASSAY GLUCOSE BLOOD QUANT: CPT | Performed by: FAMILY MEDICINE

## 2017-03-24 PROCEDURE — 36415 COLL VENOUS BLD VENIPUNCTURE: CPT | Performed by: FAMILY MEDICINE

## 2017-03-24 PROCEDURE — 80061 LIPID PANEL: CPT | Performed by: FAMILY MEDICINE

## 2017-03-24 PROCEDURE — 99396 PREV VISIT EST AGE 40-64: CPT | Performed by: FAMILY MEDICINE

## 2017-03-24 PROCEDURE — G0103 PSA SCREENING: HCPCS | Performed by: FAMILY MEDICINE

## 2017-03-24 RX ORDER — LISINOPRIL 2.5 MG/1
2.5 TABLET ORAL DAILY
Qty: 90 TABLET | Refills: 3 | Status: SHIPPED | OUTPATIENT
Start: 2017-03-24 | End: 2018-03-12

## 2017-03-24 RX ORDER — SILDENAFIL 50 MG/1
25-50 TABLET, FILM COATED ORAL DAILY PRN
Qty: 6 TABLET | Refills: 3 | Status: SHIPPED | OUTPATIENT
Start: 2017-03-24 | End: 2018-04-20

## 2017-03-24 RX ORDER — AMOXICILLIN 500 MG/1
CAPSULE ORAL
Qty: 12 CAPSULE | Refills: 1 | Status: SHIPPED | OUTPATIENT
Start: 2017-03-24 | End: 2018-03-26

## 2017-03-24 RX ORDER — LEDIPASVIR AND SOFOSBUVIR 90; 400 MG/1; MG/1
TABLET, FILM COATED ORAL
COMMUNITY
Start: 2017-03-08 | End: 2017-12-29

## 2017-03-24 RX ORDER — METOPROLOL TARTRATE 25 MG/1
12.5 TABLET, FILM COATED ORAL 2 TIMES DAILY
Qty: 90 TABLET | Refills: 3 | Status: SHIPPED | OUTPATIENT
Start: 2017-03-24 | End: 2018-04-20

## 2017-03-24 ASSESSMENT — PAIN SCALES - GENERAL: PAINLEVEL: NO PAIN (0)

## 2017-03-24 NOTE — PROGRESS NOTES
SUBJECTIVE:     CC: Marlo Cottrell is an 61 year old male who presents for preventative health visit.     Physical   Annual:     Getting at least 3 servings of Calcium per day::  Yes    Bi-annual eye exam::  NO    Dental care twice a year::  Yes    Sleep apnea or symptoms of sleep apnea::  None    Diet::  Regular (no restrictions) and Low salt    Frequency of exercise::  2-3 days/week    Duration of exercise::  15-30 minutes    Taking medications regularly::  Yes    Medication side effects::  None    Additional concerns today::  No          none    Today's PHQ-2 Score:   PHQ-2 ( 1999 [employer]) 3/22/2017   Q1: Little interest or pleasure in doing things -   Q2: Feeling down, depressed or hopeless -   PHQ-2 Score -   Little interest or pleasure in doing things Not at all   Feeling down, depressed or hopeless Not at all   PHQ-2 Score 0       Abuse: Current or Past(Physical, Sexual or Emotional)- No  Do you feel safe in your environment - Yes    Social History   Substance Use Topics     Smoking status: Former Smoker     Types: Cigarettes     Quit date: 1/1/2005     Smokeless tobacco: Never Used      Comment: non-smoking household     Alcohol use No      Comment: none since 2000     The patient does not drink >3 drinks per day nor >7 drinks per week.    Last PSA:   PSA   Date Value Ref Range Status   04/29/2015 0.20 0 - 4 ug/L Final       Recent Labs   Lab Test  03/23/16   0804  04/29/15   0803  04/11/14   0858   CHOL  134  154  155   HDL  26*  28*  26*   LDL  94  112  113   TRIG  71  68  76   CHOLHDLRATIO   --   5.5*  5.9*   NHDL  108   --    --        Reviewed orders with patient. Reviewed health maintenance and updated orders accordingly - Yes    Reviewed and updated as needed this visit by clinical staff  Tobacco  Allergies  Meds  Problems  Med Hx  Surg Hx  Fam Hx  Soc Hx          Reviewed and updated as needed this visit by Provider            ROS:  C: NEGATIVE for fever, chills, change in weight  I: NEGATIVE  for worrisome rashes, moles or lesions  E: NEGATIVE for vision changes or irritation  ENT: NEGATIVE for ear, mouth and throat problems  R: NEGATIVE for significant cough or SOB  CV: NEGATIVE for chest pain, palpitations or peripheral edema  GI: NEGATIVE for nausea, abdominal pain, heartburn, or change in bowel habits   male: negative for dysuria, hematuria, decreased urinary stream, erectile dysfunction, urethral discharge  M: NEGATIVE for significant arthralgias or myalgia  N: NEGATIVE for weakness, dizziness or paresthesias  P: NEGATIVE for changes in mood or affect    4 weeks into treatment for hep c    Feeling well     Walking a lot for exercise      Daughter Clary in good health    Needs amox for dental visits    Will likely start having intercourse soon in new relationship    Wanting viagra type med    Has hammertoe; has inserts for shoes    Never had eye exam        OBJECTIVE:     /80 (BP Location: Left arm, Patient Position: Chair, Cuff Size: Adult Regular)  Pulse 68  Temp 99.1  F (37.3  C) (Oral)  Wt 184 lb (83.5 kg)  BMI 26.4 kg/m2  EXAM:  GENERAL: healthy, alert and no distress  HENT: ear canals and TM's normal, nose and mouth without ulcers or lesions  NECK: no adenopathy, no asymmetry, masses, or scars and thyroid normal to palpation  RESP: lungs clear to auscultation - no rales, rhonchi or wheezes  CV: regular rate and rhythm, normal S1 S2, no S3 or S4, no murmur, click or rub, no peripheral edema and peripheral pulses strong  ABDOMEN: soft, nontender, no hepatosplenomegaly, no masses and bowel sounds normal   (male): normal male genitalia without lesions or urethral discharge, no hernia  RECTAL: normal sphincter tone, no rectal masses, prostate normal size, smooth, nontender without nodules or masses  MS: no gross musculoskeletal defects noted, no edema  SKIN: no suspicious lesions or rashes  NEURO: Normal strength and tone, mentation intact and speech normal  PSYCH: mentation appears  normal, affect normal/bright    ASSESSMENT/PLAN:     Marlo was seen today for physical, health maintenance and *_* health care directive *_*.    Diagnoses and all orders for this visit:    Screen for colon cancer  -     Fecal colorectal cancer screen (FIT); Future    Routine general medical examination at a health care facility    Hypertension goal BP (blood pressure) < 140/90  -     metoprolol (LOPRESSOR) 25 MG tablet; Take 0.5 tablets (12.5 mg) by mouth 2 times daily  -     lisinopril (PRINIVIL/ZESTRIL) 2.5 MG tablet; Take 1 tablet (2.5 mg) by mouth daily    Prophylactic antibiotic  -     amoxicillin (AMOXIL) 500 MG capsule; Before dental appointments take 4 pills one hour prior    Erectile dysfunction, unspecified erectile dysfunction type  -     sildenafil (REVATIO/VIAGRA) 50 MG cap/tab; Take 0.5-1 tablets (25-50 mg) by mouth daily as needed for erectile dysfunction Take 30 min to 4 hours before intercourse.  Never use with nitroglycerin, terazosin or doxazosin.    CARDIOVASCULAR SCREENING; LDL GOAL LESS THAN 100  -     Lipid panel reflex to direct LDL    Screening for diabetes mellitus  -     Glucose whole blood    Screening for prostate cancer  -     Prostate spec antigen screen    Other orders  -     Cancel: Lipid panel reflex to direct LDL    overall patient doing well  On hep c treatment currently  He has quite a few labs done for this; we just did a few others  Refill meds  Keep working on healthy diet/exercise   Prescribe viagra; discussed in detail  Do fit test      COUNSELING:   Reviewed preventive health counseling, as reflected in patient instructions       Regular exercise       Healthy diet/nutrition       Vision screening       Hearing screening       Safe sex practices/STD prevention       Colon cancer screening       Prostate cancer screening         reports that he quit smoking about 12 years ago. His smoking use included Cigarettes. He has never used smokeless tobacco.    Estimated body mass  "index is 26.4 kg/(m^2) as calculated from the following:    Height as of 2/1/17: 5' 10\" (1.778 m).    Weight as of this encounter: 184 lb (83.5 kg).   Weight management plan: Discussed healthy diet and exercise guidelines and patient will follow up in 12 months in clinic to re-evaluate.    Counseling Resources:  ATP IV Guidelines  Pooled Cohorts Equation Calculator  FRAX Risk Assessment  ICSI Preventive Guidelines  Dietary Guidelines for Americans, 2010  USDA's MyPlate  ASA Prophylaxis  Lung CA Screening    Quang Morgan MD  [facility]  Answers for HPI/ROS submitted by the patient on 3/22/2017   Q1: Little interest or pleasure in doing things: 0=Not at all  Q2: Feeling down, depressed or hopeless: 0=Not at all  PHQ-2 Score: 0    "

## 2017-03-24 NOTE — PATIENT INSTRUCTIONS
Keep working on healthy diet/exercise     We will send you lab results    Return the stool card test    Call/ return to clinic with problems/ questions

## 2017-03-24 NOTE — NURSING NOTE
"Chief Complaint   Patient presents with     Physical     Health Maintenance     *_* Health Care Directive *_*       Initial /80 (BP Location: Left arm, Patient Position: Chair, Cuff Size: Adult Regular)  Pulse 68  Temp 99.1  F (37.3  C) (Oral)  Wt 184 lb (83.5 kg)  BMI 26.4 kg/m2 Estimated body mass index is 26.4 kg/(m^2) as calculated from the following:    Height as of 2/1/17: 5' 10\" (1.778 m).    Weight as of this encounter: 184 lb (83.5 kg).  Medication Reconciliation: complete   Hillary Orta CMA      "

## 2017-03-24 NOTE — MR AVS SNAPSHOT
After Visit Summary   3/24/2017    Marlo Cottrell    MRN: [id number]           Patient Information     Date Of Birth          1955        Visit Information        Provider Department      3/24/2017 7:20 AM Quang Morgan MD [facility]        Today's Diagnoses     Screen for colon cancer    -  1    Hypertension goal BP (blood pressure) < 140/90        Prophylactic antibiotic        Erectile dysfunction, unspecified erectile dysfunction type        CARDIOVASCULAR SCREENING; LDL GOAL LESS THAN 100        Screening for diabetes mellitus        Screening for prostate cancer          Care Instructions    Keep working on healthy diet/exercise     We will send you lab results    Return the stool card test    Call/ return to clinic with problems/ questions        Follow-ups after your visit        Your next 10 appointments already scheduled     Mar 28, 2017 10:00 AM CDT   LAB with  LAB   [facility] ([facility])    [address] [place] [id number] [phone]           Patient must bring picture ID.  Patient should be prepared to give a urine specimen  Please do not eat 10-12 hours before your appointment if you are coming in fasting for labs on lipids, cholesterol, or glucose (sugar).  Pregnant women should follow their Care Team instructions. Water with medications is okay. Do not drink coffee or other fluids.   If you have concerns about taking  your medications, please ask at office or if scheduling via [email or web address], send a message by clicking on Secure Messaging, Message Your Care Team.            Mar 28, 2017 11:00 AM CDT   (Arrive by 10:45 AM)   Return General Liver with Jodi Diana PA-C   [facility] ([facility])    [address] [place] [id number] [phone]            May 22, 2017 10:00 AM CDT   LAB with  LAB    [facility] ([facility])    [address] [id number] [phone]           Patient must bring picture ID.  Patient should be prepared to give a urine specimen  Please do not eat 10-12 hours before your appointment if you are coming in fasting for labs on lipids, cholesterol, or glucose (sugar).  Pregnant women should follow their Care Team instructions. Water with medications is okay. Do not drink coffee or other fluids.   If you have concerns about taking  your medications, please ask at office or if scheduling via Adcast, send a message by clicking on Secure Messaging, Message Your Care Team.            Aug 21, 2017 10:00 AM CDT   LAB with  LAB   [facility] ([facility])    [address] [id number] [phone]           Patient must bring picture ID.  Patient should be prepared to give a urine specimen  Please do not eat 10-12 hours before your appointment if you are coming in fasting for labs on lipids, cholesterol, or glucose (sugar).  Pregnant women should follow their Care Team instructions. Water with medications is okay. Do not drink coffee or other fluids.   If you have concerns about taking  your medications, please ask at office or if scheduling via Adcast, send a message by clicking on Secure Messaging, Message Your Care Team.              Future tests that were ordered for you today     Open Future Orders        Priority Expected Expires Ordered    Fecal colorectal cancer screen (FIT) Routine 4/13/2017 6/15/2017 3/24/2017            Who to contact     If you have questions or need follow up information about today's clinic visit or your schedule please contact [facility] directly at [phone].  Normal or non-critical lab and imaging results will be communicated to you by MyChart, letter or phone within 4 business days after the clinic has received the results. If you do not hear  "from us within 7 days, please contact the clinic through Dartfish or phone. If you have a critical or abnormal lab result, we will notify you by phone as soon as possible.  Submit refill requests through Dartfish or call your pharmacy and they will forward the refill request to us. Please allow 3 business days for your refill to be completed.          Additional Information About Your Visit        [employer] Electronic Cigarette Information     Dartfish gives you secure access to your electronic health record. If you see a primary care provider, you can also send messages to your care team and make appointments. If you have questions, please call your primary care clinic.  If you do not have a primary care provider, please call [phone] and they will assist you.        Care EveryWhere ID     This is your Care EveryWhere ID. This could be used by other organizations to access your [facility] medical records  [id number]        Your Vitals Were     Pulse Temperature Height BMI (Body Mass Index)          68 99.1  F (37.3  C) (Oral) 5' 10\" (1.778 m) 26.11 kg/m2         Blood Pressure from Last 3 Encounters:   03/24/17 131/80   02/01/17 144/89   01/10/17 (!) 173/99    Weight from Last 3 Encounters:   03/24/17 182 lb (82.6 kg)   02/01/17 180 lb (81.6 kg)   01/10/17 180 lb 4.8 oz (81.8 kg)              We Performed the Following     Glucose whole blood     Lipid panel reflex to direct LDL     Prostate spec antigen screen          Today's Medication Changes          These changes are accurate as of: 3/24/17  8:08 AM.  If you have any questions, ask your nurse or doctor.               Start taking these medicines.        Dose/Directions    sildenafil 50 MG cap/tab   Commonly known as:  REVATIO/VIAGRA   Used for:  Erectile dysfunction, unspecified erectile dysfunction type   Started by:  Quang Morgan MD        Dose:  25-50 mg   Take 0.5-1 tablets (25-50 mg) by mouth daily as needed for erectile dysfunction Take 30 min to 4 hours before " intercourse.  Never use with nitroglycerin, terazosin or doxazosin.   Quantity:  6 tablet   Refills:  3         These medicines have changed or have updated prescriptions.        Dose/Directions    amoxicillin 500 MG capsule   Commonly known as:  AMOXIL   This may have changed:    - medication strength  - how much to take  - how to take this  - additional instructions   Used for:  Prophylactic antibiotic   Changed by:  Quang Morgan MD        Before dental appointments take 4 pills one hour prior   Quantity:  12 capsule   Refills:  1            Where to get your medicines      These medications were sent to [facility]/pharmacy #[id number] - [place], [place] - [facility] OF [address] [place] [place] [address] [address]     Phone:  [phone]     amoxicillin 500 MG capsule    lisinopril 2.5 MG tablet    metoprolol 25 MG tablet         Some of these will need a paper prescription and others can be bought over the counter.  Ask your nurse if you have questions.     Bring a paper prescription for each of these medications     sildenafil 50 MG cap/tab                Primary Care Provider Office Phone # Fax #    Quang Morgan [phone]       [facility] [address] [facility] [address]        Thank you!     Thank you for choosing [facility]  for your care. Our goal is always to provide you with excellent care. Hearing back from our patients is one way we can continue to improve our services. Please take a few minutes to complete the written survey that you may receive in the mail after your visit with us. Thank you!             Your Updated Medication List - Protect others around you: Learn how to safely use, store and throw away your medicines at www.disposemymeds.org.          This list is accurate as of: 3/24/17  8:08 AM.  Always use your most recent med list.                   Brand Name Dispense Instructions for use    amoxicillin 500 MG  capsule    AMOXIL    12 capsule    Before dental appointments take 4 pills one hour prior       aspirin 325 MG EC tablet      1 TABLET DAILY       calcium carbonate 500 MG chewable tablet    TUMS     Take 1 chew tab by mouth daily as needed       ledipasvir-sofosbuvir  MG per tablet   Generic drug:  ledipasvir-sofosbuvir          lisinopril 2.5 MG tablet    PRINIVIL/Zestril    90 tablet    Take 1 tablet (2.5 mg) by mouth daily       metoprolol 25 MG tablet    LOPRESSOR    90 tablet    Take 0.5 tablets (12.5 mg) by mouth 2 times daily       sildenafil 50 MG cap/tab    REVATIO/VIAGRA    6 tablet    Take 0.5-1 tablets (25-50 mg) by mouth daily as needed for erectile dysfunction Take 30 min to 4 hours before intercourse.  Never use with nitroglycerin, terazosin or doxazosin.

## 2017-03-26 NOTE — PROGRESS NOTES
The cholesterol is moderately high, and the blood sugar is mildly elevated.    Advise seeing us 2-3 months after finishing the hepatitis C medicine.   We can recheck labs at that time.    Prostate blood test is fine.    Quang Morgan MD

## 2017-03-28 ENCOUNTER — OFFICE VISIT (OUTPATIENT)
Dept: GASTROENTEROLOGY | Facility: CLINIC | Age: 62
End: 2017-03-28
Attending: PHYSICIAN ASSISTANT
Payer: COMMERCIAL

## 2017-03-28 VITALS
SYSTOLIC BLOOD PRESSURE: 115 MMHG | WEIGHT: 180.2 LBS | HEART RATE: 110 BPM | HEIGHT: 70 IN | BODY MASS INDEX: 25.8 KG/M2 | DIASTOLIC BLOOD PRESSURE: 71 MMHG | TEMPERATURE: 98.6 F

## 2017-03-28 DIAGNOSIS — B18.2 CHRONIC HEPATITIS C WITHOUT HEPATIC COMA (H): ICD-10-CM

## 2017-03-28 DIAGNOSIS — B18.2 CHRONIC HEPATITIS C WITHOUT HEPATIC COMA (H): Primary | ICD-10-CM

## 2017-03-28 LAB
ALBUMIN SERPL-MCNC: 3.8 G/DL (ref 3.4–5)
ALP SERPL-CCNC: 46 U/L (ref 40–150)
ALT SERPL W P-5'-P-CCNC: 20 U/L (ref 0–70)
ANION GAP SERPL CALCULATED.3IONS-SCNC: 7 MMOL/L (ref 3–14)
AST SERPL W P-5'-P-CCNC: 18 U/L (ref 0–45)
BILIRUB DIRECT SERPL-MCNC: 0.1 MG/DL (ref 0–0.2)
BILIRUB SERPL-MCNC: 0.5 MG/DL (ref 0.2–1.3)
BUN SERPL-MCNC: 17 MG/DL (ref 7–30)
CALCIUM SERPL-MCNC: 9 MG/DL (ref 8.5–10.1)
CHLORIDE SERPL-SCNC: 107 MMOL/L (ref 94–109)
CO2 SERPL-SCNC: 25 MMOL/L (ref 20–32)
CREAT SERPL-MCNC: 0.93 MG/DL (ref 0.66–1.25)
GFR SERPL CREATININE-BSD FRML MDRD: 82 ML/MIN/1.7M2
GLUCOSE SERPL-MCNC: 127 MG/DL (ref 70–99)
POTASSIUM SERPL-SCNC: 4.7 MMOL/L (ref 3.4–5.3)
PROT SERPL-MCNC: 8.2 G/DL (ref 6.8–8.8)
SODIUM SERPL-SCNC: 139 MMOL/L (ref 133–144)

## 2017-03-28 PROCEDURE — 36415 COLL VENOUS BLD VENIPUNCTURE: CPT | Performed by: PHYSICIAN ASSISTANT

## 2017-03-28 PROCEDURE — 80048 BASIC METABOLIC PNL TOTAL CA: CPT | Performed by: PHYSICIAN ASSISTANT

## 2017-03-28 PROCEDURE — 80076 HEPATIC FUNCTION PANEL: CPT | Performed by: PHYSICIAN ASSISTANT

## 2017-03-28 PROCEDURE — 99212 OFFICE O/P EST SF 10 MIN: CPT | Mod: ZF

## 2017-03-28 ASSESSMENT — PAIN SCALES - GENERAL: PAINLEVEL: NO PAIN (0)

## 2017-03-28 NOTE — LETTER
3/28/2017    RE: Marlo Cottrell  [address] [facility] [address]       Division of Gastroenterology, Hepatology and Nutrition Department of Medicine     Assessment/Plan  Chronic Hepatitis C,  genotype 1a Stage 2 fibrosis (Fibrosis scan 2/1/17)         Marlo Cottrell  has chronic hepatitis C.  He had a fibrosis scan today that shows he has stage 2 fibrosis.  He  has finished week 4 of treatment with Harvoni.  He is tolerating treatment very well.  He  will be done with the 12 week course of treatment on 5/22/17.  The 3 month s/p HCV RNA quant is due 8/21/17 to see if He has a sustained virological response.  This would be considered a cure.   If he  achieves the SVR, he  will no loner need to be followed in hepatology as he  has stage 2 fibrosis.     He is advised to watch for any new or increased side effects with the medication and call our hepatology nurse if this were to happen.    Thank you for allowing me to participate in the care of this patient.  If you have any questions regarding this visit and plan of care, please do not hesitate to page me at [phone].    Jodi Diana, MS, PA-C  Division of Gastroenterology, Hepatology and Nutrition      HPI  Marlo Cottrell is a pleasant 61 year old  male with chronic hepatitis C.  He is genotype 1a.  He  is  treatment naive.   He started HCV treatment on 2/27/17.   The patient is taking Harvoni.   He returns to clinic today , 4 weeks into treatment.    He  is experiencing these symptoms: some HAs intermittently, fatigue.  His  labs today show ALT of 20 and AST of 18.      ROS:  Comprehensive review of systems is negative, unless otherwise noted above    Past Medical History:   Diagnosis Date     Aortic stenosis      Chronic infection     hepatitis C     Difficulty in walking(719.7)      Hepatitis C      Hyperlipidemia      Walking troubles        Current Outpatient Prescriptions   Medication Sig Dispense Refill     LEDIPASVIR-SOFOSBUVIR   MG per tablet        metoprolol (LOPRESSOR) 25 MG tablet Take 0.5 tablets (12.5 mg) by mouth 2 times daily 90 tablet 3     lisinopril (PRINIVIL/ZESTRIL) 2.5 MG tablet Take 1 tablet (2.5 mg) by mouth daily 90 tablet 3     amoxicillin (AMOXIL) 500 MG capsule Before dental appointments take 4 pills one hour prior 12 capsule 1     sildenafil (REVATIO/VIAGRA) 50 MG cap/tab Take 0.5-1 tablets (25-50 mg) by mouth daily as needed for erectile dysfunction Take 30 min to 4 hours before intercourse.  Never use with nitroglycerin, terazosin or doxazosin. 6 tablet 3     calcium carbonate (TUMS) 500 MG chewable tablet Take 1 chew tab by mouth daily as needed        ASPIRIN 325 MG OR TBEC 1 TABLET DAILY         No Known Allergies    Family History   Problem Relation Age of Onset     CANCER Mother      lung     Alzheimer Disease Paternal Grandfather      CANCER Brother      maybe lung     DIABETES No family hx of      C.A.D. No family hx of      CEREBROVASCULAR DISEASE No family hx of        Social History     Social History     Marital status: Single     Spouse name: N/A     Number of children: 1     Years of education: N/A     Occupational History            [employer], [employer]/ receiving     Social History Main Topics     Smoking status: Former Smoker     Types: Cigarettes     Quit date: 1/1/2005     Smokeless tobacco: Never Used      Comment: non-smoking household     Alcohol use No      Comment: none since 2000     Drug use: No      Comment: never     Sexual activity: Not Currently     Partners: Female     Other Topics Concern     Parent/Sibling W/ Cabg, Mi Or Angioplasty Before 65f 55m? No     Social History Narrative       OBJECTIVE  Vitals: There were no vitals taken for this visit. There is no height or weight on file to calculate BMI.  Gen: No acute distress, well nourished  Eyes: Sclera anicteric  Skin:  no jaundice  Psych: Normal speech, normal affect    Recent Laboratory Test Results  Lab Results   Component Value Date     WBC 6.0 01/10/2017    HGB 14.6 01/10/2017    HCT 43.1 01/10/2017     01/10/2017    CHOL 211 (H) 03/24/2017    TRIG 57 03/24/2017    HDL 42 03/24/2017    ALT 20 03/28/2017    AST 18 03/28/2017     03/28/2017    TSH 3.34 04/29/2015    PSA 0.35 03/24/2017    INR 0.99 01/10/2017    ALBUMIN 3.8 03/28/2017           Jodi Diana PA-C

## 2017-03-28 NOTE — MR AVS SNAPSHOT
After Visit Summary   3/28/2017    Marlo Cottrell    MRN: [id number]           Patient Information     Date Of Birth          1955        Visit Information        Provider Department      3/28/2017 11:00 AM Jodi Diana PA-C [facility] Hepatology        Today's Diagnoses     Chronic hepatitis C without hepatic coma (H)    -  1       Follow-ups after your visit        Your next 10 appointments already scheduled     Apr 24, 2017 10:00 AM CDT   LAB with [facility])    [address] [place] [address] [id number] [phone]           Patient must bring picture ID.  Patient should be prepared to give a urine specimen  Please do not eat 10-12 hours before your appointment if you are coming in fasting for labs on lipids, cholesterol, or glucose (sugar).  Pregnant women should follow their Care Team instructions. Water with medications is okay. Do not drink coffee or other fluids.   If you have concerns about taking  your medications, please ask at office or if scheduling via Shiftboard Online Scheduling, send a message by clicking on Secure Messaging, Message Your Care Team.            Jul 24, 2017 10:00 AM CDT   LAB with  LAB   [facility] Lab ([facility])    [address] [place] [address] [id number] [phone]           Patient must bring picture ID.  Patient should be prepared to give a urine specimen  Please do not eat 10-12 hours before your appointment if you are coming in fasting for labs on lipids, cholesterol, or glucose (sugar).  Pregnant women should follow their Care Team instructions. Water with medications is okay. Do not drink coffee or other fluids.   If you have concerns about taking  your medications, please ask at office or if scheduling via Shiftboard Online Scheduling, send a message by clicking on Secure Messaging, Message Your Care Team.              Who to contact     If you have questions or need  "follow up information about today's clinic visit or your schedule please contact [facility] HEPATOLOGY directly at [phone].  Normal or non-critical lab and imaging results will be communicated to you by MyChart, letter or phone within 4 business days after the clinic has received the results. If you do not hear from us within 7 days, please contact the clinic through [employer] or phone. If you have a critical or abnormal lab result, we will notify you by phone as soon as possible.  Submit refill requests through [facility] or call your pharmacy and they will forward the refill request to us. Please allow 3 business days for your refill to be completed.          Additional Information About Your Visit        UNATION[employer]On[employer]ope Information     [facility] gives you secure access to your electronic health record. If you see a primary care provider, you can also send messages to your care team and make appointments. If you have questions, please call your primary care clinic.  If you do not have a primary care provider, please call [phone] and they will assist you.        Care EveryWhere ID     This is your Care EveryWhere ID. This could be used by other organizations to access your [place] medical records  [id number]        Your Vitals Were     Pulse Temperature Height BMI (Body Mass Index)          110 98.6  F (37  C) (Oral) 1.778 m (5' 10\") 25.86 kg/m2         Blood Pressure from Last 3 Encounters:   03/28/17 115/71   03/24/17 131/80   02/01/17 144/89    Weight from Last 3 Encounters:   03/28/17 81.7 kg (180 lb 3.2 oz)   03/24/17 82.6 kg (182 lb)   02/01/17 81.6 kg (180 lb)              Today, you had the following     No orders found for display       Primary Care Provider Office Phone # Fax #    Quang Morgan [phone]       [facility] [address] [facility] [address]        Thank you!     Thank you for choosing [facility] HEPATOLOGY  for your care. Our goal is always to " provide you with excellent care. Hearing back from our patients is one way we can continue to improve our services. Please take a few minutes to complete the written survey that you may receive in the mail after your visit with us. Thank you!             Your Updated Medication List - Protect others around you: Learn how to safely use, store and throw away your medicines at www.disposemymeds.org.          This list is accurate as of: 3/28/17  3:31 PM.  Always use your most recent med list.                   Brand Name Dispense Instructions for use    amoxicillin 500 MG capsule    AMOXIL    12 capsule    Before dental appointments take 4 pills one hour prior       aspirin 325 MG EC tablet      1 TABLET DAILY       calcium carbonate 500 MG chewable tablet    TUMS     Take 1 chew tab by mouth daily as needed       ledipasvir-sofosbuvir  MG per tablet   Generic drug:  ledipasvir-sofosbuvir          lisinopril 2.5 MG tablet    PRINIVIL/Zestril    90 tablet    Take 1 tablet (2.5 mg) by mouth daily       metoprolol 25 MG tablet    LOPRESSOR    90 tablet    Take 0.5 tablets (12.5 mg) by mouth 2 times daily       sildenafil 50 MG cap/tab    REVATIO/VIAGRA    6 tablet    Take 0.5-1 tablets (25-50 mg) by mouth daily as needed for erectile dysfunction Take 30 min to 4 hours before intercourse.  Never use with nitroglycerin, terazosin or doxazosin.

## 2017-03-28 NOTE — PROGRESS NOTES
Division of Gastroenterology, Hepatology and Nutrition Department of Medicine     Assessment/Plan  Chronic Hepatitis C,  genotype 1a Stage 2 fibrosis (Fibrosis scan 2/1/17)         Marlo Cottrell  has chronic hepatitis C.  He had a fibrosis scan today that shows he has stage 2 fibrosis.  He  has finished week 4 of treatment with Harvoni.  He is tolerating treatment very well.  He  will be done with the 12 week course of treatment on 5/22/17.  The 3 month s/p HCV RNA quant is due 8/21/17 to see if He has a sustained virological response.  This would be considered a cure.   If he  achieves the SVR, he  will no loner need to be followed in hepatology as he  has stage 2 fibrosis.     He is advised to watch for any new or increased side effects with the medication and call our hepatology nurse if this were to happen.    Thank you for allowing me to participate in the care of this patient.  If you have any questions regarding this visit and plan of care, please do not hesitate to page me at [phone].    Jodi Diana MS, PA-C  Division of Gastroenterology, Hepatology and Nutrition      HPI  Marlo Cottrell is a pleasant 61 year old  male with chronic hepatitis C.  He is genotype 1a.  He  is  treatment naive.   He started HCV treatment on 2/27/17.   The patient is taking Harvoni.   He returns to clinic today , 4 weeks into treatment.    He  is experiencing these symptoms: some HAs intermittently, fatigue.  His  labs today show ALT of 20 and AST of 18.      ROS:  Comprehensive review of systems is negative, unless otherwise noted above    Past Medical History:   Diagnosis Date     Aortic stenosis      Chronic infection     hepatitis C     Difficulty in walking(719.7)      Hepatitis C      Hyperlipidemia      Walking troubles        Current Outpatient Prescriptions   Medication Sig Dispense Refill     LEDIPASVIR-SOFOSBUVIR  MG per tablet        metoprolol (LOPRESSOR) 25 MG tablet Take 0.5 tablets (12.5 mg)  by mouth 2 times daily 90 tablet 3     lisinopril (PRINIVIL/ZESTRIL) 2.5 MG tablet Take 1 tablet (2.5 mg) by mouth daily 90 tablet 3     amoxicillin (AMOXIL) 500 MG capsule Before dental appointments take 4 pills one hour prior 12 capsule 1     sildenafil (REVATIO/VIAGRA) 50 MG cap/tab Take 0.5-1 tablets (25-50 mg) by mouth daily as needed for erectile dysfunction Take 30 min to 4 hours before intercourse.  Never use with nitroglycerin, terazosin or doxazosin. 6 tablet 3     calcium carbonate (TUMS) 500 MG chewable tablet Take 1 chew tab by mouth daily as needed        ASPIRIN 325 MG OR TBEC 1 TABLET DAILY         No Known Allergies    Family History   Problem Relation Age of Onset     CANCER Mother      lung     Alzheimer Disease Paternal Grandfather      CANCER Brother      maybe lung     DIABETES No family hx of      C.A.D. No family hx of      CEREBROVASCULAR DISEASE No family hx of        Social History     Social History     Marital status: Single     Spouse name: N/A     Number of children: 1     Years of education: N/A     Occupational History            [employer], [employer]/ receiving     Social History Main Topics     Smoking status: Former Smoker     Types: Cigarettes     Quit date: 1/1/2005     Smokeless tobacco: Never Used      Comment: non-smoking household     Alcohol use No      Comment: none since 2000     Drug use: No      Comment: never     Sexual activity: Not Currently     Partners: Female     Other Topics Concern     Parent/Sibling W/ Cabg, Mi Or Angioplasty Before 65f 55m? No     Social History Narrative       OBJECTIVE  Vitals: There were no vitals taken for this visit. There is no height or weight on file to calculate BMI.  Gen: No acute distress, well nourished  Eyes: Sclera anicteric  Skin:  no jaundice  Psych: Normal speech, normal affect    Recent Laboratory Test Results  Lab Results   Component Value Date    WBC 6.0 01/10/2017    HGB 14.6 01/10/2017    HCT 43.1 01/10/2017      01/10/2017    CHOL 211 (H) 03/24/2017    TRIG 57 03/24/2017    HDL 42 03/24/2017    ALT 20 03/28/2017    AST 18 03/28/2017     03/28/2017    TSH 3.34 04/29/2015    PSA 0.35 03/24/2017    INR 0.99 01/10/2017    ALBUMIN 3.8 03/28/2017

## 2017-03-28 NOTE — NURSING NOTE
"Chief Complaint   Patient presents with     RECHECK     4 week follow up Hep. C       Initial /71  Pulse 110  Temp 98.6  F (37  C) (Oral)  Ht 1.778 m (5' 10\")  Wt 81.7 kg (180 lb 3.2 oz)  BMI 25.86 kg/m2 Estimated body mass index is 25.86 kg/(m^2) as calculated from the following:    Height as of this encounter: 1.778 m (5' 10\").    Weight as of this encounter: 81.7 kg (180 lb 3.2 oz).  Medication Reconciliation: complete  "

## 2017-03-29 PROCEDURE — 82274 ASSAY TEST FOR BLOOD FECAL: CPT | Performed by: FAMILY MEDICINE

## 2017-03-30 DIAGNOSIS — Z12.11 SCREEN FOR COLON CANCER: ICD-10-CM

## 2017-03-30 LAB — HEMOCCULT STL QL IA: NEGATIVE

## 2017-04-24 DIAGNOSIS — B18.2 CHRONIC HEPATITIS C WITHOUT HEPATIC COMA (H): ICD-10-CM

## 2017-04-27 LAB
HCV RNA SERPL NAA+PROBE-ACNC: NORMAL [IU]/ML
HCV RNA SERPL NAA+PROBE-LOG IU: NORMAL LOG IU/ML

## 2017-07-24 DIAGNOSIS — B18.2 CHRONIC HEPATITIS C WITHOUT HEPATIC COMA (H): ICD-10-CM

## 2017-07-27 LAB
HCV RNA SERPL NAA+PROBE-ACNC: NORMAL [IU]/ML
HCV RNA SERPL NAA+PROBE-LOG IU: NORMAL LOG IU/ML

## 2017-08-08 ENCOUNTER — TELEPHONE (OUTPATIENT)
Dept: GASTROENTEROLOGY | Facility: CLINIC | Age: 62
End: 2017-08-08

## 2017-08-08 NOTE — TELEPHONE ENCOUNTER
[facility] asking if pt successfully Tx'd for hep C and for most recent labs. Can be reached at [phone], ext: [phone]. Form to be faxed. Sent to LORRAINE Campbell.

## 2017-08-20 DIAGNOSIS — I10 HYPERTENSION GOAL BP (BLOOD PRESSURE) < 140/90: ICD-10-CM

## 2017-08-21 RX ORDER — METOPROLOL TARTRATE 25 MG/1
TABLET, FILM COATED ORAL
Qty: 90 TABLET | Refills: 2 | OUTPATIENT
Start: 2017-08-21

## 2017-08-21 NOTE — TELEPHONE ENCOUNTER
8/21/2017  3:58 PM      Hep C Care Coordination Call   Returned phone call to [facility] pharmacy for review of patient completion of Hep C treatment. No answer. I left a VM with my direct contact information [phone] if the pharmacy still needs further information.       Shannan Mendoza RN, BSN, PHN  [facility]   RN Care Coordinator Hepatology Specialty Clinic/Program

## 2017-08-21 NOTE — TELEPHONE ENCOUNTER
metoprolol      Last Written Prescription Date: 3/24/17  Last Fill Quantity: 90, # refills: 3      Future Office Visit:        BP Readings from Last 3 Encounters:   03/28/17 115/71   03/24/17 131/80   02/01/17 144/89     Refill not yet needed until March, refusal sent to pharmacy with note.    Jodi Alberto RN  [facility]

## 2017-11-15 ENCOUNTER — MYC MEDICAL ADVICE (OUTPATIENT)
Dept: FAMILY MEDICINE | Facility: CLINIC | Age: 62
End: 2017-11-15

## 2017-11-15 NOTE — TELEPHONE ENCOUNTER
Document is scanned into Epic on 7-11-11.  Can  be updated to reflect this?  Is this the same thing?

## 2017-12-29 ENCOUNTER — OFFICE VISIT (OUTPATIENT)
Dept: FAMILY MEDICINE | Facility: CLINIC | Age: 62
End: 2017-12-29
Payer: COMMERCIAL

## 2017-12-29 VITALS
DIASTOLIC BLOOD PRESSURE: 85 MMHG | SYSTOLIC BLOOD PRESSURE: 138 MMHG | TEMPERATURE: 97.5 F | HEART RATE: 99 BPM | OXYGEN SATURATION: 96 % | WEIGHT: 189 LBS | BODY MASS INDEX: 27.12 KG/M2

## 2017-12-29 DIAGNOSIS — H26.9 CATARACT OF BOTH EYES, UNSPECIFIED CATARACT TYPE: ICD-10-CM

## 2017-12-29 DIAGNOSIS — Z01.818 PREOP GENERAL PHYSICAL EXAM: Primary | ICD-10-CM

## 2017-12-29 LAB
ALBUMIN SERPL-MCNC: 4.1 G/DL (ref 3.4–5)
ALP SERPL-CCNC: 59 U/L (ref 40–150)
ALT SERPL W P-5'-P-CCNC: 18 U/L (ref 0–70)
ANION GAP SERPL CALCULATED.3IONS-SCNC: 9 MMOL/L (ref 3–14)
AST SERPL W P-5'-P-CCNC: 18 U/L (ref 0–45)
BASOPHILS # BLD AUTO: 0.1 10E9/L (ref 0–0.2)
BASOPHILS NFR BLD AUTO: 0.5 %
BILIRUB SERPL-MCNC: 0.3 MG/DL (ref 0.2–1.3)
BUN SERPL-MCNC: 18 MG/DL (ref 7–30)
CALCIUM SERPL-MCNC: 10.7 MG/DL (ref 8.5–10.1)
CHLORIDE SERPL-SCNC: 105 MMOL/L (ref 94–109)
CO2 SERPL-SCNC: 26 MMOL/L (ref 20–32)
CREAT SERPL-MCNC: 1.04 MG/DL (ref 0.66–1.25)
DIFFERENTIAL METHOD BLD: NORMAL
EOSINOPHIL # BLD AUTO: 0.2 10E9/L (ref 0–0.7)
EOSINOPHIL NFR BLD AUTO: 2.6 %
ERYTHROCYTE [DISTWIDTH] IN BLOOD BY AUTOMATED COUNT: 13.2 % (ref 10–15)
GFR SERPL CREATININE-BSD FRML MDRD: 72 ML/MIN/1.7M2
GLUCOSE SERPL-MCNC: 100 MG/DL (ref 70–99)
HCT VFR BLD AUTO: 43.5 % (ref 40–53)
HGB BLD-MCNC: 14.8 G/DL (ref 13.3–17.7)
LYMPHOCYTES # BLD AUTO: 2.8 10E9/L (ref 0.8–5.3)
LYMPHOCYTES NFR BLD AUTO: 30.1 %
MCH RBC QN AUTO: 32.7 PG (ref 26.5–33)
MCHC RBC AUTO-ENTMCNC: 34 G/DL (ref 31.5–36.5)
MCV RBC AUTO: 96 FL (ref 78–100)
MONOCYTES # BLD AUTO: 0.9 10E9/L (ref 0–1.3)
MONOCYTES NFR BLD AUTO: 10 %
NEUTROPHILS # BLD AUTO: 5.2 10E9/L (ref 1.6–8.3)
NEUTROPHILS NFR BLD AUTO: 56.8 %
PLATELET # BLD AUTO: 236 10E9/L (ref 150–450)
POTASSIUM SERPL-SCNC: 4.9 MMOL/L (ref 3.4–5.3)
PROT SERPL-MCNC: 8.3 G/DL (ref 6.8–8.8)
RBC # BLD AUTO: 4.52 10E12/L (ref 4.4–5.9)
SODIUM SERPL-SCNC: 140 MMOL/L (ref 133–144)
WBC # BLD AUTO: 9.1 10E9/L (ref 4–11)

## 2017-12-29 PROCEDURE — 36415 COLL VENOUS BLD VENIPUNCTURE: CPT | Performed by: FAMILY MEDICINE

## 2017-12-29 PROCEDURE — 80053 COMPREHEN METABOLIC PANEL: CPT | Performed by: FAMILY MEDICINE

## 2017-12-29 PROCEDURE — 99214 OFFICE O/P EST MOD 30 MIN: CPT | Performed by: FAMILY MEDICINE

## 2017-12-29 PROCEDURE — 85025 COMPLETE CBC W/AUTO DIFF WBC: CPT | Performed by: FAMILY MEDICINE

## 2017-12-29 ASSESSMENT — PAIN SCALES - GENERAL: PAINLEVEL: NO PAIN (0)

## 2017-12-29 NOTE — NURSING NOTE
"Chief Complaint   Patient presents with     Pre-Op Exam       Initial BP (!) 147/97 (BP Location: Left arm, Patient Position: Chair, Cuff Size: Adult Regular)  Pulse 99  Temp 97.5  F (36.4  C) (Oral)  Wt 189 lb (85.7 kg)  SpO2 96%  BMI 27.12 kg/m2 Estimated body mass index is 27.12 kg/(m^2) as calculated from the following:    Height as of 3/28/17: 5' 10\" (1.778 m).    Weight as of this encounter: 189 lb (85.7 kg).  Medication Reconciliation: complete   Hillary Orta CMA      "

## 2017-12-29 NOTE — MR AVS SNAPSHOT
After Visit Summary   12/29/2017    Marlo Cottrell    MRN: [id number]           Patient Information     Date Of Birth          1955        Visit Information        Provider Department      12/29/2017 12:00 PM Quang Morgan MD [facility]        Today's Diagnoses     Preop general physical exam    -  1    Cataract of both eyes, unspecified cataract type          Care Instructions      Before Your Surgery      Call your surgeon if there is any change in your health. This includes signs of a cold or flu (such as a sore throat, runny nose, cough, rash or fever).    Do not smoke, drink alcohol or take over the counter medicine (unless your surgeon or primary care doctor tells you to) for the 24 hours before and after surgery.    If you take prescribed drugs: Follow your doctor s orders about which medicines to take and which to stop until after surgery.    Eating and drinking prior to surgery: follow the instructions from your surgeon    Take a shower or bath the night before surgery. Use the soap your surgeon gave you to gently clean your skin. If you do not have soap from your surgeon, use your regular soap. Do not shave or scrub the surgery site.  Wear clean pajamas and have clean sheets on your bed.         Take the metoprolol, lisinopril, and aspirin early the am of procedure    We will send you lab results    Don't take any ibuprofen for the week prior to procedures          Follow-ups after your visit        Who to contact     If you have questions or need follow up information about today's clinic visit or your schedule please contact [facility] directly at [phone].  Normal or non-critical lab and imaging results will be communicated to you by MyChart, letter or phone within 4 business days after the clinic has received the results. If you do not hear from us within 7 days, please contact the clinic through MyChart or phone. If you have a  critical or abnormal lab result, we will notify you by phone as soon as possible.  Submit refill requests through [employer] or call your pharmacy and they will forward the refill request to us. Please allow 3 business days for your refill to be completed.          Additional Information About Your Visit        [employer] Information     [employer] gives you secure access to your electronic health record. If you see a primary care provider, you can also send messages to your care team and make appointments. If you have questions, please call your primary care clinic.  If you do not have a primary care provider, please call [phone] and they will assist you.        Care EveryWhere ID     This is your Care EveryWhere ID. This could be used by other organizations to access your [place] medical records  [id number]        Your Vitals Were     Pulse Temperature Pulse Oximetry BMI (Body Mass Index)          99 97.5  F (36.4  C) (Oral) 96% 27.12 kg/m2         Blood Pressure from Last 3 Encounters:   12/29/17 138/85   03/28/17 115/71   03/24/17 131/80    Weight from Last 3 Encounters:   12/29/17 189 lb (85.7 kg)   03/28/17 180 lb 3.2 oz (81.7 kg)   03/24/17 182 lb (82.6 kg)              We Performed the Following     CBC with platelets differential     Comprehensive metabolic panel        Primary Care Provider Office Phone # Fax #    Quang Morgan [phone]       [address] [facility] [address]        Equal Access to Services     LUCIO [facility] HospitalMIKE : Hadii aad ku hadviancao Socece, waaxda luqadaha, qaybta kaalmada chikiyada, moncho pickett. So [facility] [phone].    ATENCIÓN: Si habla español, tiene a howard disposición servicios gratuitos de asistencia lingüística. Llame al [phone].    We comply with applicable federal civil rights laws and [place] laws. We do not discriminate on the basis of race, color, national origin, age, disability, sex, sexual orientation, or  gender identity.            Thank you!     Thank you for choosing [facility]  for your care. Our goal is always to provide you with excellent care. Hearing back from our patients is one way we can continue to improve our services. Please take a few minutes to complete the written survey that you may receive in the mail after your visit with us. Thank you!             Your Updated Medication List - Protect others around you: Learn how to safely use, store and throw away your medicines at www.disposemymeds.org.          This list is accurate as of: 12/29/17 12:21 PM.  Always use your most recent med list.                   Brand Name Dispense Instructions for use Diagnosis    amoxicillin 500 MG capsule    AMOXIL    12 capsule    Before dental appointments take 4 pills one hour prior    Prophylactic antibiotic       aspirin 325 MG EC tablet      1 TABLET DAILY        calcium carbonate 500 MG chewable tablet    TUMS     Take 1 chew tab by mouth daily as needed        lisinopril 2.5 MG tablet    PRINIVIL/Zestril    90 tablet    Take 1 tablet (2.5 mg) by mouth daily    Hypertension goal BP (blood pressure) < 140/90       metoprolol 25 MG tablet    LOPRESSOR    90 tablet    Take 0.5 tablets (12.5 mg) by mouth 2 times daily    Hypertension goal BP (blood pressure) < 140/90       sildenafil 50 MG tablet    VIAGRA    6 tablet    Take 0.5-1 tablets (25-50 mg) by mouth daily as needed for erectile dysfunction Take 30 min to 4 hours before intercourse.  Never use with nitroglycerin, terazosin or doxazosin.    Erectile dysfunction, unspecified erectile dysfunction type

## 2017-12-29 NOTE — PATIENT INSTRUCTIONS
Before Your Surgery      Call your surgeon if there is any change in your health. This includes signs of a cold or flu (such as a sore throat, runny nose, cough, rash or fever).    Do not smoke, drink alcohol or take over the counter medicine (unless your surgeon or primary care doctor tells you to) for the 24 hours before and after surgery.    If you take prescribed drugs: Follow your doctor s orders about which medicines to take and which to stop until after surgery.    Eating and drinking prior to surgery: follow the instructions from your surgeon    Take a shower or bath the night before surgery. Use the soap your surgeon gave you to gently clean your skin. If you do not have soap from your surgeon, use your regular soap. Do not shave or scrub the surgery site.  Wear clean pajamas and have clean sheets on your bed.         Take the metoprolol, lisinopril, and aspirin early the am of procedure    We will send you lab results    Don't take any ibuprofen for the week prior to procedures

## 2017-12-29 NOTE — PROGRESS NOTES
[address] [address]  [phone]  Dept: [phone]    PRE-OP EVALUATION:  Today's date: 2017    Marlo Cottrell (: 1955) presents for pre-operative evaluation assessment as requested by Dr. Delia Rodriguez.  He requires evaluation and anesthesia risk assessment prior to undergoing surgery/procedure for treatment of cataract .  Proposed procedure: cataract    Date of Surgery/ Procedure: 2018  Time of Surgery/ Procedure: 10:00 am  Hospital/Surgical Facility: [facility]  Fax number for surgical facility: [phone]  Primary Physician: Quang Morgan  Type of Anesthesia Anticipated: Local    Patient has a Health Care Directive or Living Will:  YES  On file    Preop Questions 2017   1.  Do you have a history of heart attack, stroke, stent, bypass or surgery on an artery in the head, neck, heart or legs? YES - aortic valve replacement   2.  Do you ever have any pain or discomfort in your chest? No   3.  Do you have a history of  Heart Failure? No   4.   Are you troubled by shortness of breath when:  walking on a level surface, or up a slight hill, or at night? No   5.  Do you currently have a cold, bronchitis or other respiratory infection? No   6.  Do you have a cough, shortness of breath, or wheezing? No   7.  Do you sometimes get pains in the calves of your legs when you walk? No   8. Do you or anyone in your family have previous history of blood clots? No   9.  Do you or does anyone in your family have a serious bleeding problem such as prolonged bleeding following surgeries or cuts? No   10. Have you ever had problems with anemia or been told to take iron pills? No   11. Have you had any abnormal blood loss such as black, tarry or bloody stools? No   12. Have you ever had a blood transfusion? No   13. Have you or any of your relatives ever had problems with anesthesia? No   14. Do you have sleep apnea, excessive  snoring or daytime drowsiness? No   15. Do you have any prosthetic heart valves? YES - AVR   16. Do you have prosthetic joints? No           HPI:                                                      Brief HPI related to upcoming procedure: 62 year old male with cataract both eyes.  To have right eye 1st and then left done 3 weeks later.            MEDICAL HISTORY:                                                    Patient Active Problem List    Diagnosis Date Noted     Chronic hepatitis C without hepatic coma (H) 01/05/2017     Priority: Medium     S/P AVR (aortic valve replacement) 09/26/2012     Priority: Medium     CVA (cerebral vascular accident) (H) 08/01/2011     Priority: Medium     Hyperlipidemia LDL goal <100 07/19/2011     Priority: Medium     Hypertension goal BP (blood pressure) < 140/90 07/19/2011     Priority: Medium     Aortic valve stenosis, severe 05/25/2011     Priority: Medium     Advanced directives, counseling/discussion 05/13/2011     Priority: Medium     Patient has living will informed to bring in a copy. JEREMY Apple           Palpitations 03/17/2010     Priority: Medium      Past Medical History:   Diagnosis Date     Aortic stenosis      Chronic infection     hepatitis C     Difficulty in walking(719.7)      Hepatitis C      Hyperlipidemia      Walking troubles      Past Surgical History:   Procedure Laterality Date     APPENDECTOMY  1967     IRRIGATION AND DEBRIDEMENT CHEST WASHOUT, COMBINED  6/28/2011    Procedure:COMBINED IRRIGATION AND DEBRIDEMENT CHEST WASHOUT; Medialstinal washout and closure.; Surgeon:VICKI IQBAL; [facility]     REPAIR VALVE AORTIC  6/27/2011    Procedure:REPAIR VALVE AORTIC; Median Sternotomy, Aortic Valve Replacement, Ascending   Aortic Aneurysm Repair and On Pump Oxygenator. ; Surgeon:VICKI IQBAL; Location:[facility] OR     Current Outpatient Prescriptions   Medication Sig Dispense Refill     metoprolol (LOPRESSOR) 25 MG tablet Take 0.5 tablets (12.5 mg) by mouth 2 times  daily 90 tablet 3     lisinopril (PRINIVIL/ZESTRIL) 2.5 MG tablet Take 1 tablet (2.5 mg) by mouth daily 90 tablet 3     amoxicillin (AMOXIL) 500 MG capsule Before dental appointments take 4 pills one hour prior 12 capsule 1     sildenafil (REVATIO/VIAGRA) 50 MG cap/tab Take 0.5-1 tablets (25-50 mg) by mouth daily as needed for erectile dysfunction Take 30 min to 4 hours before intercourse.  Never use with nitroglycerin, terazosin or doxazosin. 6 tablet 3     calcium carbonate (TUMS) 500 MG chewable tablet Take 1 chew tab by mouth daily as needed        ASPIRIN 325 MG OR TBEC 1 TABLET DAILY       OTC products: None, except as noted above    No Known Allergies   Latex Allergy: NO    Social History   Substance Use Topics     Smoking status: Former Smoker     Types: Cigarettes     Quit date: 1/1/2005     Smokeless tobacco: Never Used      Comment: non-smoking household     Alcohol use No      Comment: none since 2000     History   Drug Use No     Comment: never       REVIEW OF SYSTEMS:                                                    Constitutional, neuro, ENT, endocrine, pulmonary, cardiac, gastrointestinal, genitourinary, musculoskeletal, integument and psychiatric systems are negative, except as otherwise noted.    No recent illnesses          EXAM:                                                    BP (!) 147/97 (BP Location: Left arm, Patient Position: Chair, Cuff Size: Adult Regular)  Pulse 99  Temp 97.5  F (36.4  C) (Oral)  Wt 189 lb (85.7 kg)  SpO2 96%  BMI 27.12 kg/m2    GENERAL APPEARANCE: healthy, alert and no distress     HENT: ear canals and TM's normal and nose and mouth without ulcers or lesions     NECK: no adenopathy, no asymmetry, masses, or scars and thyroid normal to palpation     RESP: lungs clear to auscultation - no rales, rhonchi or wheezes     CV: regular rates and rhythm, normal S1 S2, no S3 or S4.  Has the artificial valve sound over aortic area     ABDOMEN:  soft, nontender, no HSM or  masses and bowel sounds normal     MS: extremities normal- no gross deformities noted, no evidence of inflammation in joints, FROM in all extremities.     SKIN: no suspicious lesions or rashes     NEURO: Normal strength and tone, sensory exam grossly normal, mentation intact and speech normal     PSYCH: mentation appears normal. and affect normal/bright     LYMPHATICS: No axillary, cervical, or supraclavicular nodes    DIAGNOSTICS:                                                    See echo from 2016    Labs drawn, pending ( cmp and cbc )      Recent Labs   Lab Test  03/28/17   0940  01/10/17   1219  03/23/16   0804  04/29/15   0803   HGB   --   14.6  14.5  14.1   PLT   --   223  206  206   INR   --   0.99   --   0.98   NA  139  140  139  137   POTASSIUM  4.7  4.4  4.1  4.8   CR  0.93  0.89  0.80  0.90   A1C   --    --   5.6  5.7        IMPRESSION:                                                    Reason for surgery/procedure: bilateral cataracts  Diagnosis/reason for consult: pre-op clearance    The proposed surgical procedure is considered LOW risk.    REVISED CARDIAC RISK INDEX  The patient has the following serious cardiovascular risks for perioperative complications such as (MI, PE, VFib and 3  AV Block):  No serious cardiac risks  INTERPRETATION: 0 risks: Class I (very low risk - 0.4% complication rate)    The patient has the following additional risks for perioperative complications:  No identified additional risks      ICD-10-CM    1. Preop general physical exam Z01.818        RECOMMENDATIONS:                                                           --Patient is to take all scheduled medications on the day of surgery EXCEPT for modifications listed below.    Patient will take the metoprolol, lisinopril, and aspirin early am of procedure with sip of water    Of note, patient had  Aortic valve replaced ( tissue valve ) back in 2011.  No cardiac problems since then.  No suspicious symptoms.    He finished up  treatment for hepatitis in April of this year.  Cured of Hep C.    Of note, hemoglobin a1c earlier this year was 5.6, so no diabetes.    On recheck blood pressure was okay      APPROVAL GIVEN to proceed with proposed procedure, without further diagnostic evaluation, providing labs are okay.       Signed Electronically by: Quang Morgan MD    Copy of this evaluation report is provided to requesting physician.    [facility] Preop Guidelines

## 2017-12-30 NOTE — PROGRESS NOTES
Your pre-op labs are okay.      Proceed with the cataract procedures as planned.    The calcium is a bit high, but this may not be significant.  See me in about 3-4 months so we can recheck this.    Quang Morgan MD    [facility]- please fax to [facility] preo  Thanks  Quang Morgan MD

## 2018-01-13 DIAGNOSIS — I10 HYPERTENSION GOAL BP (BLOOD PRESSURE) < 140/90: ICD-10-CM

## 2018-01-15 RX ORDER — METOPROLOL TARTRATE 25 MG/1
TABLET, FILM COATED ORAL
Qty: 90 TABLET | Refills: 3 | OUTPATIENT
Start: 2018-01-15

## 2018-01-15 NOTE — TELEPHONE ENCOUNTER
"Requested Prescriptions   Pending Prescriptions Disp Refills     metoprolol tartrate (LOPRESSOR) 25 MG tablet [Pharmacy Med Name: METOPROLOL TARTRATE 25 MG TAB] 90 tablet 3    Last Written Prescription Date:  3/24/17  Last Fill Quantity: 90,  # refills: 3   Last Office Visit with G, P or [facility] prescribing provider:  12/29/17   Future Office Visit:      Sig: TAKE 0.5 TABLETS (12.5 MG) BY MOUTH 2 TIMES DAILY    Beta-Blockers Protocol Passed    1/13/2018  2:12 AM       Passed - Blood pressure under 140/90    BP Readings from Last 3 Encounters:   12/29/17 138/85   03/28/17 115/71   03/24/17 131/80                Passed - Patient is age 6 or older       Passed - Recent or future visit with authorizing provider's specialty    Patient had office visit in the last year or has a visit in the next 30 days with authorizing provider.  See \"Patient Info\" tab in inbasket, or \"Choose Columns\" in Meds & Orders section of the refill encounter.                 "

## 2018-03-12 DIAGNOSIS — I10 HYPERTENSION GOAL BP (BLOOD PRESSURE) < 140/90: ICD-10-CM

## 2018-03-12 NOTE — TELEPHONE ENCOUNTER
"Requested Prescriptions   Pending Prescriptions Disp Refills     lisinopril (PRINIVIL/ZESTRIL) 2.5 MG tablet [Pharmacy Med Name: LISINOPRIL 2.5 MG TABLET] 90 tablet 3    Last Written Prescription Date:  3/24/17  Last Fill Quantity: 90,  # refills: 3   Last office visit: 12/29/2017 with prescribing provider:     Future Office Visit:     Sig: TAKE 1 TABLET (2.5 MG) BY MOUTH DAILY    ACE Inhibitors (Including Combos) Protocol Passed    3/12/2018  1:53 AM       Passed - Blood pressure under 140/90 in past 12 months    BP Readings from Last 3 Encounters:   12/29/17 138/85   03/28/17 115/71   03/24/17 131/80                Passed - Recent (12 mo) or future (30 days) visit within the authorizing provider's specialty    Patient had office visit in the last 12 months or has a visit in the next 30 days with authorizing provider or within the authorizing provider's specialty.  See \"Patient Info\" tab in inbasket, or \"Choose Columns\" in Meds & Orders section of the refill encounter.           Passed - Patient is age 18 or older       Passed - Normal serum creatinine on file in past 12 months    Recent Labs   Lab Test  12/29/17   1233   CR  1.04            Passed - Normal serum potassium on file in past 12 months    Recent Labs   Lab Test  12/29/17   1233   POTASSIUM  4.9               "

## 2018-03-14 RX ORDER — LISINOPRIL 2.5 MG/1
TABLET ORAL
Qty: 30 TABLET | Refills: 0 | Status: SHIPPED | OUTPATIENT
Start: 2018-03-14 | End: 2018-04-14

## 2018-03-14 NOTE — TELEPHONE ENCOUNTER
Prescription approved per [facility] Refill Protocol.  30 day supply as patient is due to be seen for lab recheck.    Chelo Vides RN  [facility]

## 2018-03-26 ENCOUNTER — OFFICE VISIT (OUTPATIENT)
Dept: FAMILY MEDICINE | Facility: CLINIC | Age: 63
End: 2018-03-26
Payer: COMMERCIAL

## 2018-03-26 VITALS
HEIGHT: 70 IN | SYSTOLIC BLOOD PRESSURE: 133 MMHG | TEMPERATURE: 97.8 F | OXYGEN SATURATION: 95 % | BODY MASS INDEX: 27.92 KG/M2 | HEART RATE: 102 BPM | WEIGHT: 195 LBS | DIASTOLIC BLOOD PRESSURE: 93 MMHG

## 2018-03-26 DIAGNOSIS — Z86.19 HISTORY OF HEPATITIS C: ICD-10-CM

## 2018-03-26 DIAGNOSIS — J20.9 ACUTE BRONCHITIS, UNSPECIFIED ORGANISM: Primary | ICD-10-CM

## 2018-03-26 DIAGNOSIS — Z79.2 PROPHYLACTIC ANTIBIOTIC: ICD-10-CM

## 2018-03-26 PROBLEM — B18.2 CHRONIC HEPATITIS C WITHOUT HEPATIC COMA (H): Status: RESOLVED | Noted: 2017-01-05 | Resolved: 2018-03-26

## 2018-03-26 PROCEDURE — 99213 OFFICE O/P EST LOW 20 MIN: CPT | Performed by: FAMILY MEDICINE

## 2018-03-26 RX ORDER — AZITHROMYCIN 250 MG/1
TABLET, FILM COATED ORAL
Qty: 6 TABLET | Refills: 1 | Status: SHIPPED | OUTPATIENT
Start: 2018-03-26 | End: 2018-04-20

## 2018-03-26 RX ORDER — AMOXICILLIN 500 MG/1
CAPSULE ORAL
Qty: 12 CAPSULE | Refills: 1 | Status: SHIPPED | OUTPATIENT
Start: 2018-03-26

## 2018-03-26 ASSESSMENT — PAIN SCALES - GENERAL: PAINLEVEL: NO PAIN (0)

## 2018-03-26 NOTE — PROGRESS NOTES
SUBJECTIVE:   Marlo Cottrell is a 62 year old male who presents to clinic today for the following health issues:       ENT Symptoms             Symptoms: cc Present Absent Comment   Fever/Chills  x     Fatigue  x     Muscle Aches   x    Eye Irritation   x    Sneezing   x    Nasal Marek/Drg   x    Sinus Pressure/Pain   x    Loss of smell   x    Dental pain   x    Sore Throat   x    Swollen Glands   x    Ear Pain/Fullness   x    Cough  x     Wheeze  x     Chest Pain   x    Shortness of breath   x    Rash   x    Other         Symptom duration:  8   Symptom severity:  moderate   Treatments tried:  cold and flu, cough syrup   Contacts:  none         none    Problem list and histories reviewed & adjusted, as indicated.  Additional history: as documented         Reviewed and updated as needed this visit by clinical staff       Reviewed and updated as needed this visit by Provider          started some dental amox last week, helped some    nyquil helped , took it day and night    Took some diphenhydramine    Worked half days last week    Worked full day today ( 1 hour early off to come here )    Had cataracts earlier this year ( procedures )    Physical Exam   Constitutional: He is oriented to person, place, and time and well-developed, well-nourished, and in no distress.   HENT:   Head: Normocephalic and atraumatic.   Right Ear: Tympanic membrane, external ear and ear canal normal.   Left Ear: Tympanic membrane, external ear and ear canal normal.   No sinus/ submandib tenderness     Eyes: Conjunctivae are normal.   Neck: Neck supple.   Cardiovascular: Normal rate, regular rhythm and normal heart sounds.    Pulmonary/Chest: Effort normal. No respiratory distress. He exhibits no tenderness.   Some scattered coarse sounds     Abdominal: Soft. There is no tenderness.   No back or costovertebral angle tenderness     Musculoskeletal: He exhibits no edema.   Lymphadenopathy:     He has no cervical adenopathy.   Neurological: He  is alert and oriented to person, place, and time.         ASSESSMENT / PLAN:  (J20.9) Acute bronchitis, unspecified organism  (primary encounter diagnosis)  Comment: prudent to cover with antibiotics   Plan: azithromycin (ZITHROMAX) 250 MG tablet        See AVS    (Z79.2) Prophylactic antibiotic  Comment: need to resupply for upcoming dental visits   Plan: amoxicillin (AMOXIL) 500 MG capsule             (Z86.19) History of hepatitis C  Comment: change chronic hep c to history of hep c since he is s/p treatment, has zero virus  Plan:  As above       I reviewed the patient's medications, allergies, medical history, family history, and social history.    Quang Morgan MD

## 2018-03-26 NOTE — MR AVS SNAPSHOT
After Visit Summary   3/26/2018    Marlo Cottrell    MRN: [id number]           Patient Information     Date Of Birth          1955        Visit Information        Provider Department      3/26/2018 3:20 PM Quang Morgan MD [facility]        Today's Diagnoses     History of hepatitis C    -  1    Prophylactic antibiotic        Acute bronchitis, unspecified organism          Care Instructions    Take the azithromycin; one refill available if needed    Keep amoxicillin for dental    Stay well hydrated    Follow up if not resolving    Be seen promptly if symptoms worsen    Be careful about diphenhydramine and nyquil type meds causing sedation          Follow-ups after your visit        Who to contact     If you have questions or need follow up information about today's clinic visit or your schedule please contact [facility] directly at [phone].  Normal or non-critical lab and imaging results will be communicated to you by MyChart, letter or phone within 4 business days after the clinic has received the results. If you do not hear from us within 7 days, please contact the clinic through MyChart or phone. If you have a critical or abnormal lab result, we will notify you by phone as soon as possible.  Submit refill requests through "[employer] or call your pharmacy and they will forward the refill request to us. Please allow 3 business days for your refill to be completed.          Additional Information About Your Visit        MyChart Information     "[employer] gives you secure access to your electronic health record. If you see a primary care provider, you can also send messages to your care team and make appointments. If you have questions, please call your primary care clinic.  If you do not have a primary care provider, please call [phone] and they will assist you.        Care EveryWhere ID     This is your Care EveryWhere ID. This could be used by  "other organizations to access your [facility] medical records  [id number]        Your Vitals Were     Pulse Temperature Height Pulse Oximetry BMI (Body Mass Index)       102 97.8  F (36.6  C) (Oral) 5' 10\" (1.778 m) 95% 27.98 kg/m2        Blood Pressure from Last 3 Encounters:   03/26/18 (!) 133/93   12/29/17 138/85   03/28/17 115/71    Weight from Last 3 Encounters:   03/26/18 195 lb (88.5 kg)   12/29/17 189 lb (85.7 kg)   03/28/17 180 lb 3.2 oz (81.7 kg)              Today, you had the following     No orders found for display         Today's Medication Changes          These changes are accurate as of 3/26/18  3:52 PM.  If you have any questions, ask your nurse or doctor.               Start taking these medicines.        Dose/Directions    azithromycin 250 MG tablet   Commonly known as:  ZITHROMAX   Used for:  Acute bronchitis, unspecified organism   Started by:  Quang Morgan MD        2 po daily x one day then 1 po daily x 4 days   Quantity:  6 tablet   Refills:  1            Where to get your medicines      These medications were sent to [facility]/pharmacy #[id number] - [place] [address] AT [address] OF [address] [place] [id number]     Phone:  [phone]     amoxicillin 500 MG capsule    azithromycin 250 MG tablet                Primary Care Provider Office Phone # Fax #    Quang Morgan [phone]       [address] [facility] [address]        Equal Access to Services     [facility] : Hadii aad ku hadasho Soomaali, waaxda luqadaha, qaybta kaalmada adeegyada, waxay elizabeth pickett. So [facility] [phone].    ATENCIÓN: Si habla español, tiene a howard disposición servicios gratuitos de asistencia lingüística. Llame al [phone].    We comply with applicable federal civil rights laws and [place] laws. We do not discriminate on the basis of race, color, national origin, age, disability, sex, sexual orientation, or gender " identity.            Thank you!     Thank you for choosing [facility]  for your care. Our goal is always to provide you with excellent care. Hearing back from our patients is one way we can continue to improve our services. Please take a few minutes to complete the written survey that you may receive in the mail after your visit with us. Thank you!             Your Updated Medication List - Protect others around you: Learn how to safely use, store and throw away your medicines at www.disposemymeds.org.          This list is accurate as of 3/26/18  3:52 PM.  Always use your most recent med list.                   Brand Name Dispense Instructions for use Diagnosis    amoxicillin 500 MG capsule    AMOXIL    12 capsule    Before dental appointments take 4 pills one hour prior    Prophylactic antibiotic       aspirin 325 MG EC tablet      1 TABLET DAILY        azithromycin 250 MG tablet    ZITHROMAX    6 tablet    2 po daily x one day then 1 po daily x 4 days    Acute bronchitis, unspecified organism       calcium carbonate 500 MG chewable tablet    TUMS     Take 1 chew tab by mouth daily as needed        lisinopril 2.5 MG tablet    PRINIVIL/Zestril    30 tablet    TAKE 1 TABLET (2.5 MG) BY MOUTH DAILY    Hypertension goal BP (blood pressure) < 140/90       metoprolol tartrate 25 MG tablet    LOPRESSOR    90 tablet    Take 0.5 tablets (12.5 mg) by mouth 2 times daily    Hypertension goal BP (blood pressure) < 140/90       sildenafil 50 MG tablet    VIAGRA    6 tablet    Take 0.5-1 tablets (25-50 mg) by mouth daily as needed for erectile dysfunction Take 30 min to 4 hours before intercourse.  Never use with nitroglycerin, terazosin or doxazosin.    Erectile dysfunction, unspecified erectile dysfunction type

## 2018-03-26 NOTE — PATIENT INSTRUCTIONS
Take the azithromycin; one refill available if needed    Keep amoxicillin for dental    Stay well hydrated    Follow up if not resolving    Be seen promptly if symptoms worsen    Be careful about diphenhydramine and nyquil type meds causing sedation

## 2018-04-14 ENCOUNTER — TELEPHONE (OUTPATIENT)
Dept: FAMILY MEDICINE | Facility: CLINIC | Age: 63
End: 2018-04-14

## 2018-04-14 DIAGNOSIS — I10 HYPERTENSION GOAL BP (BLOOD PRESSURE) < 140/90: ICD-10-CM

## 2018-04-16 RX ORDER — LISINOPRIL 2.5 MG/1
TABLET ORAL
Qty: 90 TABLET | Refills: 0 | Status: SHIPPED | OUTPATIENT
Start: 2018-04-16 | End: 2018-04-20

## 2018-04-16 NOTE — TELEPHONE ENCOUNTER
"Requested Prescriptions   Pending Prescriptions Disp Refills     lisinopril (PRINIVIL/ZESTRIL) 2.5 MG tablet [Pharmacy Med Name: LISINOPRIL 2.5 MG TABLET] 30 tablet 0    Last Written Prescription Date:  3/14/18  Last Fill Quantity: 30,  # refills: 0   Last office visit: 3/26/2018 with prescribing provider:     Future Office Visit:     Sig: TAKE 1 TABLET (2.5 MG) BY MOUTH DAILY    ACE Inhibitors (Including Combos) Protocol Failed    4/14/2018  1:20 AM       Failed - Blood pressure under 140/90 in past 12 months    BP Readings from Last 3 Encounters:   03/26/18 (!) 133/93   12/29/17 138/85   03/28/17 115/71                Passed - Recent (12 mo) or future (30 days) visit within the authorizing provider's specialty    Patient had office visit in the last 12 months or has a visit in the next 30 days with authorizing provider or within the authorizing provider's specialty.  See \"Patient Info\" tab in inbasket, or \"Choose Columns\" in Meds & Orders section of the refill encounter.           Passed - Patient is age 18 or older       Passed - Normal serum creatinine on file in past 12 months    Recent Labs   Lab Test  12/29/17   1233   CR  1.04            Passed - Normal serum potassium on file in past 12 months    Recent Labs   Lab Test  12/29/17   1233   POTASSIUM  4.9               "

## 2018-04-16 NOTE — TELEPHONE ENCOUNTER
Routing refill request to provider for review/approval because failed medication protocol:  Blood pressures out of range:    BP Readings from Last 6 Encounters:   03/26/18 (!) 133/93   12/29/17 138/85   03/28/17 115/71   03/24/17 131/80   02/01/17 144/89   01/10/17 (!) 173/99     Route to PCP    Bev AyalaRN

## 2018-04-16 NOTE — TELEPHONE ENCOUNTER
Okayed 90 day refill but advise clinic appointment in next 2-3 months    Please inform patient    Quang Morgan MD

## 2018-04-16 NOTE — TELEPHONE ENCOUNTER
Left detailed message, informing patient to schedule a fasting lab appointment and BP check with PCP.

## 2018-04-20 ENCOUNTER — OFFICE VISIT (OUTPATIENT)
Dept: FAMILY MEDICINE | Facility: CLINIC | Age: 63
End: 2018-04-20
Payer: COMMERCIAL

## 2018-04-20 VITALS
TEMPERATURE: 98.3 F | DIASTOLIC BLOOD PRESSURE: 80 MMHG | BODY MASS INDEX: 28.12 KG/M2 | WEIGHT: 196 LBS | HEART RATE: 82 BPM | SYSTOLIC BLOOD PRESSURE: 125 MMHG

## 2018-04-20 DIAGNOSIS — Z86.19 HISTORY OF HEPATITIS C: ICD-10-CM

## 2018-04-20 DIAGNOSIS — N52.9 ERECTILE DYSFUNCTION, UNSPECIFIED ERECTILE DYSFUNCTION TYPE: ICD-10-CM

## 2018-04-20 DIAGNOSIS — I10 HYPERTENSION GOAL BP (BLOOD PRESSURE) < 140/90: ICD-10-CM

## 2018-04-20 DIAGNOSIS — Z12.11 SCREEN FOR COLON CANCER: ICD-10-CM

## 2018-04-20 DIAGNOSIS — Z12.5 SCREENING FOR PROSTATE CANCER: ICD-10-CM

## 2018-04-20 DIAGNOSIS — Z11.9 SCREENING EXAMINATION FOR INFECTIOUS DISEASE: ICD-10-CM

## 2018-04-20 DIAGNOSIS — E78.5 HYPERLIPIDEMIA LDL GOAL <100: ICD-10-CM

## 2018-04-20 DIAGNOSIS — Z00.00 ROUTINE GENERAL MEDICAL EXAMINATION AT A HEALTH CARE FACILITY: Primary | ICD-10-CM

## 2018-04-20 LAB
ALBUMIN SERPL-MCNC: 3.7 G/DL (ref 3.4–5)
ALP SERPL-CCNC: 56 U/L (ref 40–150)
ALT SERPL W P-5'-P-CCNC: 13 U/L (ref 0–70)
ANION GAP SERPL CALCULATED.3IONS-SCNC: 7 MMOL/L (ref 3–14)
AST SERPL W P-5'-P-CCNC: 17 U/L (ref 0–45)
BILIRUB SERPL-MCNC: 0.5 MG/DL (ref 0.2–1.3)
BUN SERPL-MCNC: 19 MG/DL (ref 7–30)
CALCIUM SERPL-MCNC: 9.1 MG/DL (ref 8.5–10.1)
CHLORIDE SERPL-SCNC: 108 MMOL/L (ref 94–109)
CHOLEST SERPL-MCNC: 163 MG/DL
CO2 SERPL-SCNC: 24 MMOL/L (ref 20–32)
CREAT SERPL-MCNC: 1.07 MG/DL (ref 0.66–1.25)
GFR SERPL CREATININE-BSD FRML MDRD: 69 ML/MIN/1.7M2
GLUCOSE SERPL-MCNC: 124 MG/DL (ref 70–99)
HDLC SERPL-MCNC: 34 MG/DL
HIV 1+2 AB+HIV1 P24 AG SERPL QL IA: NONREACTIVE
LDLC SERPL CALC-MCNC: 113 MG/DL
NONHDLC SERPL-MCNC: 129 MG/DL
POTASSIUM SERPL-SCNC: 4.3 MMOL/L (ref 3.4–5.3)
PROT SERPL-MCNC: 7.7 G/DL (ref 6.8–8.8)
PSA SERPL-ACNC: 0.36 UG/L (ref 0–4)
SODIUM SERPL-SCNC: 139 MMOL/L (ref 133–144)
TRIGL SERPL-MCNC: 79 MG/DL

## 2018-04-20 PROCEDURE — 99396 PREV VISIT EST AGE 40-64: CPT | Performed by: FAMILY MEDICINE

## 2018-04-20 PROCEDURE — 80061 LIPID PANEL: CPT | Performed by: FAMILY MEDICINE

## 2018-04-20 PROCEDURE — G0103 PSA SCREENING: HCPCS | Performed by: FAMILY MEDICINE

## 2018-04-20 PROCEDURE — 80053 COMPREHEN METABOLIC PANEL: CPT | Performed by: FAMILY MEDICINE

## 2018-04-20 PROCEDURE — 87389 HIV-1 AG W/HIV-1&-2 AB AG IA: CPT | Performed by: FAMILY MEDICINE

## 2018-04-20 PROCEDURE — 87522 HEPATITIS C REVRS TRNSCRPJ: CPT | Performed by: FAMILY MEDICINE

## 2018-04-20 PROCEDURE — 36415 COLL VENOUS BLD VENIPUNCTURE: CPT | Performed by: FAMILY MEDICINE

## 2018-04-20 RX ORDER — SILDENAFIL 50 MG/1
25-50 TABLET, FILM COATED ORAL DAILY PRN
Qty: 6 TABLET | Refills: 3 | Status: CANCELLED | OUTPATIENT
Start: 2018-04-20

## 2018-04-20 RX ORDER — LISINOPRIL 2.5 MG/1
TABLET ORAL
Qty: 90 TABLET | Refills: 3 | Status: SHIPPED | OUTPATIENT
Start: 2018-04-20 | End: 2018-09-12

## 2018-04-20 RX ORDER — METOPROLOL TARTRATE 25 MG/1
12.5 TABLET, FILM COATED ORAL 2 TIMES DAILY
Qty: 90 TABLET | Refills: 3 | Status: SHIPPED | OUTPATIENT
Start: 2018-04-20 | End: 2019-04-09

## 2018-04-20 ASSESSMENT — PAIN SCALES - GENERAL: PAINLEVEL: NO PAIN (0)

## 2018-04-20 NOTE — MR AVS SNAPSHOT
After Visit Summary   4/20/2018    Marlo Cottrell    MRN: [id number]           Patient Information     Date Of Birth          1955        Visit Information        Provider Department      4/20/2018 7:00 AM Quang Morgan MD [facility]        Today's Diagnoses     Screen for colon cancer    -  1    Hypertension goal BP (blood pressure) < 140/90        Erectile dysfunction, unspecified erectile dysfunction type        Screening examination for infectious disease        Screening for prostate cancer        Hyperlipidemia LDL goal <100        History of hepatitis C          Care Instructions    We will send you lab results    Keep working on healthy diet/exercise and weight loss              Follow-ups after your visit        Future tests that were ordered for you today     Open Future Orders        Priority Expected Expires Ordered    Fecal colorectal cancer screen (FIT) Routine 5/10/2018 7/12/2018 4/20/2018            Who to contact     If you have questions or need follow up information about today's clinic visit or your schedule please contact [facility] directly at [phone].  Normal or non-critical lab and imaging results will be communicated to you by Viva[employer], letter or phone within 4 business days after the clinic has received the results. If you do not hear from us within 7 days, please contact the clinic through iGistic[employer] or phone. If you have a critical or abnormal lab result, we will notify you by phone as soon as possible.  Submit refill requests through [employer] or call your pharmacy and they will forward the refill request to us. Please allow 3 business days for your refill to be completed.          Additional Information About Your Visit        MyChart Information     [employer] gives you secure access to your electronic health record. If you see a primary care provider, you can also send messages to your care team and make appointments. If  you have questions, please call your primary care clinic.  If you do not have a primary care provider, please call [phone] and they will assist you.        Care EveryWhere ID     This is your Care EveryWhere ID. This could be used by other organizations to access your [facility] medical records  [id number]        Your Vitals Were     Pulse Temperature BMI (Body Mass Index)             82 98.3  F (36.8  C) (Oral) 28.12 kg/m2          Blood Pressure from Last 3 Encounters:   04/20/18 125/80   03/26/18 (!) 133/93   12/29/17 138/85    Weight from Last 3 Encounters:   04/20/18 196 lb (88.9 kg)   03/26/18 195 lb (88.5 kg)   12/29/17 189 lb (85.7 kg)              We Performed the Following     Comprehensive metabolic panel     Hepatitis C RNA, quantitative     HIV Antigen Antibody Combo     Lipid panel reflex to direct LDL Fasting     Prostate spec antigen screen          Today's Medication Changes          These changes are accurate as of 4/20/18  7:49 AM.  If you have any questions, ask your nurse or doctor.               These medicines have changed or have updated prescriptions.        Dose/Directions    lisinopril 2.5 MG tablet   Commonly known as:  PRINIVIL/Zestril   This may have changed:  See the new instructions.   Used for:  Hypertension goal BP (blood pressure) < 140/90   Changed by:  Quang Morgan MD        TAKE 1 TABLET (2.5 MG) BY MOUTH DAILY   Quantity:  90 tablet   Refills:  3         Stop taking these medicines if you haven't already. Please contact your care team if you have questions.     sildenafil 50 MG tablet   Commonly known as:  VIAGRA   Stopped by:  Quang Morgan MD                Where to get your medicines      These medications were sent to [facility]pharmacy #[id number] - [place], [place] - [address] AT [address]NER OF [address] [place] [id number]     Phone:  [phone]     lisinopril 2.5 MG tablet    metoprolol tartrate 25 MG tablet                Primary Care Provider  Office Phone # Fax #    Quang Morgan [phone]       [address] [facility] [address]        Equal Access to Services     LUNA PENALOZA : Hadii aad ku hadviancareynaldo Benson, wachristineda luqlexyha, qasabineta kajavonda ryanne, moncho harp riverazuly sow laShakilaleno pickett. So [facility] [phone].    ATENCIÓN: Si habla español, tiene a howard disposición servicios gratuitos de asistencia lingüística. Llame al [phone].    We comply with applicable federal civil rights laws and [place] laws. We do not discriminate on the basis of race, color, national origin, age, disability, sex, sexual orientation, or gender identity.            Thank you!     Thank you for choosing [facility]  for your care. Our goal is always to provide you with excellent care. Hearing back from our patients is one way we can continue to improve our services. Please take a few minutes to complete the written survey that you may receive in the mail after your visit with us. Thank you!             Your Updated Medication List - Protect others around you: Learn how to safely use, store and throw away your medicines at www.disposemymeds.org.          This list is accurate as of 4/20/18  7:49 AM.  Always use your most recent med list.                   Brand Name Dispense Instructions for use Diagnosis    amoxicillin 500 MG capsule    AMOXIL    12 capsule    Before dental appointments take 4 pills one hour prior    Prophylactic antibiotic       aspirin 325 MG EC tablet      1 TABLET DAILY        calcium carbonate 500 MG chewable tablet    TUMS     Take 1 chew tab by mouth daily as needed        lisinopril 2.5 MG tablet    PRINIVIL/Zestril    90 tablet    TAKE 1 TABLET (2.5 MG) BY MOUTH DAILY    Hypertension goal BP (blood pressure) < 140/90       metoprolol tartrate 25 MG tablet    LOPRESSOR    90 tablet    Take 0.5 tablets (12.5 mg) by mouth 2 times daily    Hypertension goal BP (blood pressure) < 140/90

## 2018-04-20 NOTE — PROGRESS NOTES
SUBJECTIVE:   CC: Marlo Cottrell is an 62 year old male who presents for preventative health visit.     Physical   Annual:     Getting at least 3 servings of Calcium per day::  Yes    Bi-annual eye exam::  Yes    Dental care twice a year::  Yes    Sleep apnea or symptoms of sleep apnea::  None    Diet::  Regular (no restrictions)    Frequency of exercise::  2-3 days/week    Duration of exercise::  45-60 minutes    Taking medications regularly::  Yes    Medication side effects::  None    Additional concerns today::  No                none    Today's PHQ-2 Score:   PHQ-2 ( 1999 Pfizer) 4/16/2018   Q1: Little interest or pleasure in doing things 0   Q2: Feeling down, depressed or hopeless 0   PHQ-2 Score 0   Q1: Little interest or pleasure in doing things Not at all   Q2: Feeling down, depressed or hopeless Not at all   PHQ-2 Score 0       Abuse: Current or Past(Physical, Sexual or Emotional)- No  Do you feel safe in your environment - Yes    Social History   Substance Use Topics     Smoking status: Former Smoker     Types: Cigarettes     Quit date: 1/1/2005     Smokeless tobacco: Never Used      Comment: non-smoking household     Alcohol use No      Comment: none since 2000     Alcohol Use 4/16/2018   If you drink alcohol do you typically have greater than 3 drinks per day OR greater than 7 drinks per week? Not Applicable   No flowsheet data found.    Last PSA:   PSA   Date Value Ref Range Status   03/24/2017 0.35 0 - 4 ug/L Final     Comment:     Assay Method:  Chemiluminescence using Siemens Vista analyzer       Reviewed orders with patient. Reviewed health maintenance and updated orders accordingly - Yes       Reviewed and updated as needed this visit by clinical staff  Tobacco  Allergies  Meds  Problems  Med Hx  Surg Hx  Fam Hx  Soc Hx          Reviewed and updated as needed this visit by Provider            Review of Systems  C: NEGATIVE for fever, chills, change in weight  I: NEGATIVE for worrisome rashes,  moles or lesions  E: NEGATIVE for vision changes or irritation  ENT: NEGATIVE for ear, mouth and throat problems  R: NEGATIVE for significant cough or SOB  CV: NEGATIVE for chest pain, palpitations or peripheral edema  GI: NEGATIVE for nausea, abdominal pain, heartburn, or change in bowel habits   male: negative for dysuria, hematuria, decreased urinary stream, erectile dysfunction, urethral discharge  M: NEGATIVE for significant arthralgias or myalgia  N: NEGATIVE for weakness, dizziness or paresthesias    Got over the coughing illness    Hasn't needed the viagra    Sees cardiology every 2 years now    Walking at work   Lifting lots of things at work      OBJECTIVE:   BP (!) 143/94 (BP Location: Right arm, Patient Position: Chair, Cuff Size: Adult Regular)  Pulse 82  Temp 98.3  F (36.8  C) (Oral)  Wt 196 lb (88.9 kg)  BMI 28.12 kg/m2    Physical Exam  GENERAL: healthy, alert and no distress  EYES: Eyes grossly normal to inspection, PERRL and conjunctivae and sclerae normal  HENT: ear canals and TM's normal, nose and mouth without ulcers or lesions  NECK: no adenopathy, no asymmetry, masses, or scars and thyroid normal to palpation  RESP: lungs clear to auscultation - no rales, rhonchi or wheezes  CV: regular rate and rhythm, normal S1 S2, no S3 or S4, no murmur, click or rub, no peripheral edema and peripheral pulses strong  ABDOMEN: soft, nontender, no hepatosplenomegaly, no masses and bowel sounds normal   (male): normal male genitalia without lesions or urethral discharge, no hernia  RECTAL: normal sphincter tone, no rectal masses, prostate normal size, smooth, nontender without nodules or masses  MS: no gross musculoskeletal defects noted, no edema  SKIN: no suspicious lesions or rashes  NEURO: Normal strength and tone, mentation intact and speech normal  PSYCH: mentation appears normal, affect normal/bright    ASSESSMENT/PLAN:   Marlo was seen today for physical and health maintenance.    Diagnoses and  "all orders for this visit:    Routine general medical examination at a health care facility    Hypertension goal BP (blood pressure) < 140/90  -     lisinopril (PRINIVIL/ZESTRIL) 2.5 MG tablet; TAKE 1 TABLET (2.5 MG) BY MOUTH DAILY  -     metoprolol tartrate (LOPRESSOR) 25 MG tablet; Take 0.5 tablets (12.5 mg) by mouth 2 times daily    Erectile dysfunction, unspecified erectile dysfunction type    Screen for colon cancer  -     Fecal colorectal cancer screen (FIT); Future    Screening examination for infectious disease  -     HIV Antigen Antibody Combo    Screening for prostate cancer  -     Prostate spec antigen screen    Hyperlipidemia LDL goal <100  -     Lipid panel reflex to direct LDL Fasting  -     Comprehensive metabolic panel    History of hepatitis C  -     Hepatitis C RNA, quantitative    Other orders  -     Cancel: Lipid panel reflex to direct LDL Fasting  -     Cancel: sildenafil (VIAGRA) 50 MG tablet; Take 0.5-1 tablets (25-50 mg) by mouth daily as needed Take 30 min to 4 hours before intercourse.  Never use with nitroglycerin, terazosin or doxazosin.    Overall patient in good health  Went through hep c treatment  Totally over the bronchitis  Keep working on healthy diet/exercise and wt loss  Check labs  Fit test      COUNSELING:   Reviewed preventive health counseling, as reflected in patient instructions       Regular exercise       Healthy diet/nutrition       Vision screening       Safe sex practices/STD prevention       Colon cancer screening       Prostate cancer screening         reports that he quit smoking about 13 years ago. His smoking use included Cigarettes. He has never used smokeless tobacco.    Estimated body mass index is 28.12 kg/(m^2) as calculated from the following:    Height as of 3/26/18: 5' 10\" (1.778 m).    Weight as of this encounter: 196 lb (88.9 kg).   Weight management plan: Discussed healthy diet and exercise guidelines and patient will follow up in 12 months in clinic to " re-evaluate.    Counseling Resources:  ATP IV Guidelines  Pooled Cohorts Equation Calculator  FRAX Risk Assessment  ICSI Preventive Guidelines  Dietary Guidelines for Americans, 2010  USDA's MyPlate  ASA Prophylaxis  Lung CA Screening    Quang Morgan MD  [facility]  Answers for HPI/ROS submitted by the patient on 4/16/2018   PHQ-2 Score: 0

## 2018-04-21 PROCEDURE — 82274 ASSAY TEST FOR BLOOD FECAL: CPT | Performed by: FAMILY MEDICINE

## 2018-04-23 LAB
HCV RNA SERPL NAA+PROBE-ACNC: NORMAL [IU]/ML
HCV RNA SERPL NAA+PROBE-LOG IU: NORMAL LOG IU/ML

## 2018-04-24 NOTE — PROGRESS NOTES
No hepatitis C virus detected.    Cholesterol is better than last year.    Other labs are okay.    Quang Morgan MD

## 2018-04-26 ENCOUNTER — APPOINTMENT (OUTPATIENT)
Dept: LAB | Facility: CLINIC | Age: 63
End: 2018-04-26
Attending: PATHOLOGY
Payer: COMMERCIAL

## 2018-04-26 DIAGNOSIS — Z12.11 SCREEN FOR COLON CANCER: ICD-10-CM

## 2018-04-29 LAB — HEMOCCULT STL QL IA: NEGATIVE

## 2018-06-06 ENCOUNTER — TELEPHONE (OUTPATIENT)
Dept: CARDIOLOGY | Facility: CLINIC | Age: 63
End: 2018-06-06

## 2018-06-06 DIAGNOSIS — Z95.2 S/P AVR: Primary | ICD-10-CM

## 2018-06-06 NOTE — TELEPHONE ENCOUNTER
Reason for Call:  Order for ultrasound    Detailed comments: Please call to discuss order needed for an ultrasound prior to appointment scheduled on 7-. Thank you.    Phone Number Patient can be reached at: Home number on file [phone] (home)    Best Time: any    Can we leave a detailed message on this number? YES    Call taken on 6/6/2018 at 9:08 AM by Mallory Chapman

## 2018-06-07 NOTE — TELEPHONE ENCOUNTER
Echo order entered. Left voicemail for pt to call and schedule echo at his convenience. [employer] message also sent.    Camden Lugo RN

## 2018-07-12 ENCOUNTER — RADIANT APPOINTMENT (OUTPATIENT)
Dept: CARDIOLOGY | Facility: CLINIC | Age: 63
End: 2018-07-12
Attending: INTERNAL MEDICINE
Payer: COMMERCIAL

## 2018-07-12 DIAGNOSIS — Z95.2 S/P AVR: ICD-10-CM

## 2018-07-12 PROCEDURE — 93306 TTE W/DOPPLER COMPLETE: CPT | Performed by: INTERNAL MEDICINE

## 2018-07-12 NOTE — NURSING NOTE
Patient presents today for resting echo ordered by MD.   Echo Tech provided patient education.    Echo technician completed resting echo. Data transferred to [facility] for final interpretation through [employer].   Patient education provided about cardiology interpretation and primary provider will be notified of results.    Shannan Stevens RDCS

## 2018-07-13 DIAGNOSIS — I10 HYPERTENSION GOAL BP (BLOOD PRESSURE) < 140/90: ICD-10-CM

## 2018-07-13 NOTE — TELEPHONE ENCOUNTER
"Requested Prescriptions   Pending Prescriptions Disp Refills     lisinopril (PRINIVIL/ZESTRIL) 2.5 MG tablet [Pharmacy Med Name: LISINOPRIL 2.5 MG TABLET] 90 tablet 0     Sig: TAKE 1 TABLET (2.5 MG) BY MOUTH DAILY    ACE Inhibitors (Including Combos) Protocol Passed    7/13/2018 12:56 AM       Passed - Blood pressure under 140/90 in past 12 months    BP Readings from Last 3 Encounters:   04/20/18 125/80   03/26/18 (!) 133/93   12/29/17 138/85                Passed - Recent (12 mo) or future (30 days) visit within the authorizing provider's specialty    Patient had office visit in the last 12 months or has a visit in the next 30 days with authorizing provider or within the authorizing provider's specialty.  See \"Patient Info\" tab in inbasket, or \"Choose Columns\" in Meds & Orders section of the refill encounter.           Passed - Patient is age 18 or older       Passed - Normal serum creatinine on file in past 12 months    Recent Labs   Lab Test  04/20/18   0753   CR  1.07            Passed - Normal serum potassium on file in past 12 months    Recent Labs   Lab Test  04/20/18   0753   POTASSIUM  4.3             Last Written Prescription Date:  4/20/18  Last Fill Quantity: 90,  # refills: 3   Last office visit: 4/20/2018 with prescribing provider:  KAYLIN   Future Office Visit:   Next 5 appointments (look out 90 days)     Jul 19, 2018 10:00 AM CDT   Return Visit with Alex Norris MD   [facility] ([facility])    [address] [place] [id number] [phone]                   "

## 2018-07-16 RX ORDER — LISINOPRIL 2.5 MG/1
TABLET ORAL
Qty: 90 TABLET | Refills: 1 | Status: SHIPPED | OUTPATIENT
Start: 2018-07-16 | End: 2019-06-28

## 2018-07-16 NOTE — TELEPHONE ENCOUNTER
This is duplicate should have refills on it.  Same pharmacy?    lisinopril (PRINIVIL/ZESTRIL) 2.5 MG tablet 90 tablet 3 4/20/2018  No   Sig: TAKE 1 TABLET (2.5 MG) BY MOUTH DAILY   Class: E-Prescribe   Order: [id number]   E-Prescribing Status: Receipt confirmed by pharmacy (4/20/2018  7:42 AM CDT)

## 2018-07-19 ENCOUNTER — OFFICE VISIT (OUTPATIENT)
Dept: CARDIOLOGY | Facility: CLINIC | Age: 63
End: 2018-07-19
Payer: COMMERCIAL

## 2018-07-19 VITALS
BODY MASS INDEX: 27.26 KG/M2 | OXYGEN SATURATION: 96 % | HEART RATE: 82 BPM | WEIGHT: 190 LBS | SYSTOLIC BLOOD PRESSURE: 126 MMHG | DIASTOLIC BLOOD PRESSURE: 88 MMHG

## 2018-07-19 DIAGNOSIS — Z95.2 S/P AVR (AORTIC VALVE REPLACEMENT): Primary | ICD-10-CM

## 2018-07-19 DIAGNOSIS — I10 HYPERTENSION GOAL BP (BLOOD PRESSURE) < 140/90: ICD-10-CM

## 2018-07-19 PROCEDURE — 99213 OFFICE O/P EST LOW 20 MIN: CPT | Performed by: INTERNAL MEDICINE

## 2018-07-19 NOTE — LETTER
7/19/2018      RE: Marlo Cottrell  [address] [facility] [address]       Dear Colleague,    Thank you for the opportunity to participate in the care of your patient, Marlo Cottrell, at the [facility]. Please see a copy of my visit note below.    CARDIOLOGY CONSULTATION  Referring Provider: Alex Norris   Primary Care Provider: Quang Morgan     HPI:   Marlo Cottrell is a 63 year old male, being seen today for recheck of an AVR for AS. Patient denies chest pain, dyspnea (exertional or rest), palpitations, LE edema, orthopnea, PND, or syncope. He has no complaints about his medications and has been able to take them without obvious side effects       PAST MEDICAL HISTORY:  The patient's past medical history was reviewed  CURRENT MEDICATIONS:  Current Outpatient Prescriptions   Medication Sig Dispense Refill     amoxicillin (AMOXIL) 500 MG capsule Before dental appointments take 4 pills one hour prior 12 capsule 1     ASPIRIN 325 MG OR TBEC 1 TABLET DAILY       calcium carbonate (TUMS) 500 MG chewable tablet Take 1 chew tab by mouth daily as needed        lisinopril (PRINIVIL/ZESTRIL) 2.5 MG tablet TAKE 1 TABLET (2.5 MG) BY MOUTH DAILY 90 tablet 3     metoprolol tartrate (LOPRESSOR) 25 MG tablet Take 0.5 tablets (12.5 mg) by mouth 2 times daily 90 tablet 3     lisinopril (PRINIVIL/ZESTRIL) 2.5 MG tablet TAKE 1 TABLET (2.5 MG) BY MOUTH DAILY (Patient not taking: Reported on 7/19/2018) 90 tablet 1        PAST SURGICAL HISTORY:   has a past surgical history that includes appendectomy (1967); Irrigation and debridement chest washout, combined (6/28/2011); and Repair valve aortic (6/27/2011).     ALLERGIES   No Known Allergies     FAMILY HX:  Patient denies family history of acute sudden death, hyperlipidemia, premature coronary artery disease, CHF, valvular disease, diabetes.    SUBSTANCE HX:  Marlo   reports that he does not drink alcohol. and  reports that he quit smoking about 13 years ago. His smoking use included Cigarettes. He has never used smokeless tobacco..    ROS:  Constitutional: No fever, chills, or sweats. No weight gain/loss.   ENT: No visual disturbance, ear ache, epistaxis, sore throat.   Allergies/Immunologic: Negative.   Respiratory: No cough, hemoptysis.   Cardiovascular: As per HPI.   GI: No nausea, vomiting, hematemesis, melena, or hematochezia.   : No urinary frequency, dysuria, or hematuria.   Integument: Negative.   Psychiatric: Negative.   Neuro: Negative.   Endocrinology: biorderline hypothyroidism.   Musculoskeletal: No myalgia.      VITAL SIGNS:  /88 (BP Location: Left arm, Patient Position: Chair, Cuff Size: Adult Large)  Pulse 82  Wt 86.2 kg (190 lb)  SpO2 96%  BMI 27.26 kg/m2  Body mass index is 27.26 kg/(m^2).  Wt Readings from Last 2 Encounters:   07/19/18 86.2 kg (190 lb)   04/20/18 88.9 kg (196 lb)       PHYSICAL EXAM  Marlo Cottrell is a 61 year old male in no acute distress.  HEENT: Unremarkable.  Neck: JVP normal.  Carotids +4/4 bilaterally without bruits.  Lungs: CTA.  Cor: RRR. Normal S1 and S2.  No murmur, rub, or gallop.  PMI in Lf 5th ICS.  Abd: Soft, nontender, nondistended.  NABS.  No pulsatile mass.  Extremities: No C/C/E.  Pulses +4/4 symmetric in upper and lower extremities.  Neuro: Grossly intact.    LABS    WBC      5.2   3/23/2016  RBC     4.28   3/23/2016  HGB     14.5   3/23/2016  HCT     41.6   3/23/2016  No components found with this name: mct  MCV       97   3/23/2016  MCH     33.9   3/23/2016  MCHC     34.9   3/23/2016  RDW     12.8   3/23/2016  PLT      206   3/23/2016   Recent Labs   Lab Test  03/23/16   0804  04/29/15   0803   NA  139  137   POTASSIUM  4.1  4.8   CHLORIDE  107  105   CO2  26  27   ANIONGAP  6  5   GLC  127*  137*   BUN  16  17   CR  0.80  0.90   NEERAJ  9.1  8.8     Recent Labs   Lab Test  03/23/16   0804  04/29/15   0803   04/11/14   0858   CHOL  134  154  155   HDL  26*  28*  26*   LDL  94  112  113   TRIG  71  68  76   CHOLHDLRATIO   --   5.5*  5.9*            ECHO: pending      CABG/ Cardiac surgery:SAVR      ASSESSMENT AND PLAN:    S/P bioprosthetic AVR as above and doing very well.  Continue current meds. AVR gradient at 19 mmHg doing well and will follow up with another ECHO in one year  Follow up in two year.          Please do not hesitate to contact me if you have any questions/concerns.     Sincerely,     Alex Norris MD

## 2018-07-19 NOTE — NURSING NOTE
"Chief Complaint   Patient presents with     Follow Up For     2 year cardiac follow up for Aortic valve replacement history; patient reports no cardiac concerns at this time.       Initial /88 (BP Location: Left arm, Patient Position: Chair, Cuff Size: Adult Large)  Pulse 82  Wt 86.2 kg (190 lb)  SpO2 96%  BMI 27.26 kg/m2 Estimated body mass index is 27.26 kg/(m^2) as calculated from the following:    Height as of 3/26/18: 1.778 m (5' 10\").    Weight as of this encounter: 86.2 kg (190 lb)..  BP completed using cuff size: LANDRY Hyde        "

## 2018-07-19 NOTE — NURSING NOTE
Med Reconcile: Reviewed and verified all current medications with the patient. The updated medication list was printed and given to the patient.  Return Appointment: Patient given instructions regarding scheduling next clinic visit. Patient demonstrated understanding of this information and agreed to call with further questions or concerns.  Follow up in 2 years  Patient stated he understood all health information given and agreed to call with further questions or concerns.  Deana Russell RN

## 2018-07-19 NOTE — MR AVS SNAPSHOT
After Visit Summary   7/19/2018    Marlo Cottrell    MRN: [id number]           Patient Information     Date Of Birth          1955        Visit Information        Provider Department      7/19/2018 10:00 AM Alex Norris MD [facility] PHYSICIANS HEART AT [facility]        Today's Diagnoses     S/P AVR (aortic valve replacement)    -  1    Hypertension goal BP (blood pressure) < 140/90          Care Instructions    1. Dr. Alex Norris would like you to return for a cardiac follow up in 2 years  (July 2020).  We will contact you regarding your appointment when the time draws closer or you may call [phone] to arrange an appointment.  Mean while, if you should have any questions or concerns regarding your heart health, please contact us.  Thank you for choosing [facility] for your care.        [facility] Physicians Cardiology - [place] Location    If you have any questions regarding your visit please contact your care team:     Cardiology  Telephone Number   Deana Russell,   Cardiology RN   Lizzy Coronado MA [phone]    After hours: [phone]   For scheduling appts:     [phone] or  [phone]    After hours: [phone]   For the Device Clinic (Pacemakers and ICD's)  RN's :  Yolanda Pablo   During business hours: [phone]  After business hours:  [phone]- select option 4 and ask for job code [id number].     Normal test result notifications will be released via [employer] or mailed within 7 business days.  All other test results, will be communicated via telephone once reviewed by your cardiologist.    If you need a medication refill please contact your pharmacy.  Please allow 3 business days for your refill to be completed.    As always, thank you for trusting us with your health care needs!                Follow-ups after your visit        Who to contact     If you have questions or need follow up information about  today's clinic visit or your schedule please contact [facility] PHYSICIANS HEART AT [facility] directly at [phone].  Normal or non-critical lab and imaging results will be communicated to you by MyChart, letter or phone within 4 business days after the clinic has received the results. If you do not hear from us within 7 days, please contact the clinic through [employer] or phone. If you have a critical or abnormal lab result, we will notify you by phone as soon as possible.  Submit refill requests through [employer] or call your pharmacy and they will forward the refill request to us. Please allow 3 business days for your refill to be completed.          Additional Information About Your Visit        [employer] Information     [employer] gives you secure access to your electronic health record. If you see a primary care provider, you can also send messages to your care team and make appointments. If you have questions, please call your primary care clinic.  If you do not have a primary care provider, please call [phone] and they will assist you.        Care EveryWhere ID     This is your Care EveryWhere ID. This could be used by other organizations to access your [place] medical records  [id number]        Your Vitals Were     Pulse Pulse Oximetry BMI (Body Mass Index)             82 96% 27.26 kg/m2          Blood Pressure from Last 3 Encounters:   07/19/18 126/88   04/20/18 125/80   03/26/18 (!) 133/93    Weight from Last 3 Encounters:   07/19/18 86.2 kg (190 lb)   04/20/18 88.9 kg (196 lb)   03/26/18 88.5 kg (195 lb)              Today, you had the following     No orders found for display       Primary Care Provider Office Phone # Fax #    Quang Morgan [phone]       [address] [facility] [address]        Equal Access to Services     LUNA PENALOZA : Shavonne Benson, marlena butterfield, moncho pimentel  laneil pickett. So [facility] [phone].    ATENCIÓN: Si habla shannon, tiene a howard disposición servicios gratuitos de asistencia lingüística. Lina boles [phone].    We comply with applicable federal civil rights laws and [place] laws. We do not discriminate on the basis of race, color, national origin, age, disability, sex, sexual orientation, or gender identity.            Thank you!     Thank you for choosing [facility] PHYSICIANS HEART AT [facility]  for your care. Our goal is always to provide you with excellent care. Hearing back from our patients is one way we can continue to improve our services. Please take a few minutes to complete the written survey that you may receive in the mail after your visit with us. Thank you!             Your Updated Medication List - Protect others around you: Learn how to safely use, store and throw away your medicines at www.disposemymeds.org.          This list is accurate as of 7/19/18 11:44 AM.  Always use your most recent med list.                   Brand Name Dispense Instructions for use Diagnosis    amoxicillin 500 MG capsule    AMOXIL    12 capsule    Before dental appointments take 4 pills one hour prior    Prophylactic antibiotic       aspirin 325 MG EC tablet      1 TABLET DAILY        calcium carbonate 500 MG chewable tablet    TUMS     Take 1 chew tab by mouth daily as needed        * lisinopril 2.5 MG tablet    PRINIVIL/Zestril    90 tablet    TAKE 1 TABLET (2.5 MG) BY MOUTH DAILY    Hypertension goal BP (blood pressure) < 140/90       * lisinopril 2.5 MG tablet    PRINIVIL/Zestril    90 tablet    TAKE 1 TABLET (2.5 MG) BY MOUTH DAILY    Hypertension goal BP (blood pressure) < 140/90       metoprolol tartrate 25 MG tablet    LOPRESSOR    90 tablet    Take 0.5 tablets (12.5 mg) by mouth 2 times daily    Hypertension goal BP (blood pressure) < 140/90       * Notice:  This list has 2 medication(s) that are the same as other medications prescribed for  you. Read the directions carefully, and ask your doctor or other care provider to review them with you.

## 2018-07-19 NOTE — PROGRESS NOTES
CARDIOLOGY CONSULTATION  Referring Provider: Alex Norris   Primary Care Provider: Quang Morgan     HPI:   Marlo Cottrell is a 63 year old male, being seen today for recheck of an AVR for AS. Patient denies chest pain, dyspnea (exertional or rest), palpitations, LE edema, orthopnea, PND, or syncope. He has no complaints about his medications and has been able to take them without obvious side effects       PAST MEDICAL HISTORY:  The patient's past medical history was reviewed  CURRENT MEDICATIONS:  Current Outpatient Prescriptions   Medication Sig Dispense Refill     amoxicillin (AMOXIL) 500 MG capsule Before dental appointments take 4 pills one hour prior 12 capsule 1     ASPIRIN 325 MG OR TBEC 1 TABLET DAILY       calcium carbonate (TUMS) 500 MG chewable tablet Take 1 chew tab by mouth daily as needed        lisinopril (PRINIVIL/ZESTRIL) 2.5 MG tablet TAKE 1 TABLET (2.5 MG) BY MOUTH DAILY 90 tablet 3     metoprolol tartrate (LOPRESSOR) 25 MG tablet Take 0.5 tablets (12.5 mg) by mouth 2 times daily 90 tablet 3     lisinopril (PRINIVIL/ZESTRIL) 2.5 MG tablet TAKE 1 TABLET (2.5 MG) BY MOUTH DAILY (Patient not taking: Reported on 7/19/2018) 90 tablet 1        PAST SURGICAL HISTORY:   has a past surgical history that includes appendectomy (1967); Irrigation and debridement chest washout, combined (6/28/2011); and Repair valve aortic (6/27/2011).     ALLERGIES   No Known Allergies     FAMILY HX:  Patient denies family history of acute sudden death, hyperlipidemia, premature coronary artery disease, CHF, valvular disease, diabetes.    SUBSTANCE HX:  Marlo  reports that he does not drink alcohol. and  reports that he quit smoking about 13 years ago. His smoking use included Cigarettes. He has never used smokeless tobacco..    ROS:  Constitutional: No fever, chills, or sweats. No weight gain/loss.   ENT: No visual disturbance, ear ache, epistaxis, sore throat.   Allergies/Immunologic: Negative.   Respiratory: No  cough, hemoptysis.   Cardiovascular: As per HPI.   GI: No nausea, vomiting, hematemesis, melena, or hematochezia.   : No urinary frequency, dysuria, or hematuria.   Integument: Negative.   Psychiatric: Negative.   Neuro: Negative.   Endocrinology: biorderline hypothyroidism.   Musculoskeletal: No myalgia.      VITAL SIGNS:  /88 (BP Location: Left arm, Patient Position: Chair, Cuff Size: Adult Large)  Pulse 82  Wt 86.2 kg (190 lb)  SpO2 96%  BMI 27.26 kg/m2  Body mass index is 27.26 kg/(m^2).  Wt Readings from Last 2 Encounters:   07/19/18 86.2 kg (190 lb)   04/20/18 88.9 kg (196 lb)       PHYSICAL EXAM  Marlo Cottrell is a 61 year old male in no acute distress.  HEENT: Unremarkable.  Neck: JVP normal.  Carotids +4/4 bilaterally without bruits.  Lungs: CTA.  Cor: RRR. Normal S1 and S2.  No murmur, rub, or gallop.  PMI in Lf 5th ICS.  Abd: Soft, nontender, nondistended.  NABS.  No pulsatile mass.  Extremities: No C/C/E.  Pulses +4/4 symmetric in upper and lower extremities.  Neuro: Grossly intact.    LABS    WBC      5.2   3/23/2016  RBC     4.28   3/23/2016  HGB     14.5   3/23/2016  HCT     41.6   3/23/2016  No components found with this name: mct  MCV       97   3/23/2016  MCH     33.9   3/23/2016  MCHC     34.9   3/23/2016  RDW     12.8   3/23/2016  PLT      206   3/23/2016   Recent Labs   Lab Test  03/23/16   0804  04/29/15   0803   NA  139  137   POTASSIUM  4.1  4.8   CHLORIDE  107  105   CO2  26  27   ANIONGAP  6  5   GLC  127*  137*   BUN  16  17   CR  0.80  0.90   NEERAJ  9.1  8.8     Recent Labs   Lab Test  03/23/16   0804  04/29/15   0803  04/11/14   0858   CHOL  134  154  155   HDL  26*  28*  26*   LDL  94  112  113   TRIG  71  68  76   CHOLHDLRATIO   --   5.5*  5.9*            ECHO: pending      CABG/ Cardiac surgery:SAVR      ASSESSMENT AND PLAN:    S/P bioprosthetic AVR as above and doing very well.  Continue current meds. AVR gradient at 19 mmHg doing well and will follow up with another ECHO  in one year  Follow up in two year.

## 2018-07-19 NOTE — PATIENT INSTRUCTIONS
1. Dr. Alex Norris would like you to return for a cardiac follow up in 2 years  (July 2020).  We will contact you regarding your appointment when the time draws closer or you may call [phone] to arrange an appointment.  Mean while, if you should have any questions or concerns regarding your heart health, please contact us.  Thank you for choosing [facility] for your care.        [facility] Physicians Cardiology - [place] Location    If you have any questions regarding your visit please contact your care team:     Cardiology  Telephone Number   Deana Russell,   Cardiology RN   Lizzy Coronado MA [phone]    After hours: [phone]   For scheduling appts:     [phone] or  [phone]    After hours: [phone]   For the Device Clinic (Pacemakers and ICD's)  RN's :  Yolanda Pablo   During business hours: [phone]  After business hours:  [phone]- select option 4 and ask for job code [id number].     Normal test result notifications will be released via [employer] or mailed within 7 business days.  All other test results, will be communicated via telephone once reviewed by your cardiologist.    If you need a medication refill please contact your pharmacy.  Please allow 3 business days for your refill to be completed.    As always, thank you for trusting us with your health care needs!

## 2018-09-12 ENCOUNTER — OFFICE VISIT (OUTPATIENT)
Dept: FAMILY MEDICINE | Facility: CLINIC | Age: 63
End: 2018-09-12
Payer: COMMERCIAL

## 2018-09-12 VITALS
WEIGHT: 194 LBS | HEART RATE: 70 BPM | TEMPERATURE: 98.6 F | SYSTOLIC BLOOD PRESSURE: 135 MMHG | DIASTOLIC BLOOD PRESSURE: 80 MMHG | BODY MASS INDEX: 27.84 KG/M2

## 2018-09-12 DIAGNOSIS — Z91.89 PNEUMOCOCCAL VACCINATION INDICATED: ICD-10-CM

## 2018-09-12 DIAGNOSIS — M25.561 RIGHT KNEE PAIN, UNSPECIFIED CHRONICITY: Primary | ICD-10-CM

## 2018-09-12 DIAGNOSIS — I10 HYPERTENSION GOAL BP (BLOOD PRESSURE) < 140/90: ICD-10-CM

## 2018-09-12 PROCEDURE — 99213 OFFICE O/P EST LOW 20 MIN: CPT | Mod: 25 | Performed by: FAMILY MEDICINE

## 2018-09-12 PROCEDURE — 90670 PCV13 VACCINE IM: CPT | Performed by: FAMILY MEDICINE

## 2018-09-12 PROCEDURE — 90471 IMMUNIZATION ADMIN: CPT | Performed by: FAMILY MEDICINE

## 2018-09-12 ASSESSMENT — PAIN SCALES - GENERAL: PAINLEVEL: MODERATE PAIN (4)

## 2018-09-12 NOTE — PROGRESS NOTES
SUBJECTIVE:   Marlo Cottrell is a 63 year old male who presents to clinic today for the following health issues:       Right knee pain for the past few months    none    Problem list and histories reviewed & adjusted, as indicated.  Additional history: as documented         Reviewed and updated as needed this visit by clinical staff       Reviewed and updated as needed this visit by Provider          history of L5 disk problems    No knee trauma history       Left knee moves smoothly through range of motion     On palpation, right knee feels rough/ crunching when moving.  No audible sounds    Range of motion okay    Strength and stability okay    Mild pain on rotational testing    No point tenderness over knee    Right knee is swollen compared to left    No discoloration/ redness    When squatting down, lots of pain    Sensation and strength are normal in both lower extremities.  Negative straight leg raising test bilaterally.  Able get up on heels and toes normally.  No pain on axial loading.     Note xray was not available this am so patient will do knee xray tomorrow am    ASSESSMENT / PLAN:  (M25.561) Right knee pain, unspecified chronicity  (primary encounter diagnosis)  Comment: likely bad arthritis  Plan: XR Knee Standing Right 2 Views        I will call patient with xray report and proceed appropriately     (Z91.89) Pneumococcal vaccination indicated  Comment: needs   Plan: PNEUMOCOCCAL CONJ VACCINE 13 VALENT IM, VACCINE        ADMINISTRATION, INITIAL        Given     (I10) Hypertension goal BP (blood pressure) < 140/90  Comment: on recheck was okay  Plan: no change in meds       I reviewed the patient's medications, allergies, medical history, family history, and social history.    Quang Morgan MD

## 2018-09-12 NOTE — PATIENT INSTRUCTIONS
Do xray only appointment tomorrow am    I will call with xray result    Just monitor the bone spur on shoulder    Keep working on healthy diet/exercise as able

## 2018-09-12 NOTE — MR AVS SNAPSHOT
After Visit Summary   9/12/2018    Marlo Cottrell    MRN: [id number]           Patient Information     Date Of Birth          1955        Visit Information        Provider Department      9/12/2018 8:00 AM Quang Morgan MD [facility]        Today's Diagnoses     Pneumococcal vaccination indicated    -  1    Right knee pain, unspecified chronicity          Care Instructions    Do xray only appointment tomorrow am    I will call with xray result    Just monitor the bone spur on shoulder    Keep working on healthy diet/exercise as able           Follow-ups after your visit        Future tests that were ordered for you today     Open Future Orders        Priority Expected Expires Ordered    XR Knee Standing Right 2 Views Routine 9/12/2018 9/12/2019 9/12/2018            Who to contact     If you have questions or need follow up information about today's clinic visit or your schedule please contact [facility] directly at [phone].  Normal or non-critical lab and imaging results will be communicated to you by MyChart, letter or phone within 4 business days after the clinic has received the results. If you do not hear from us within 7 days, please contact the clinic through [employer] or phone. If you have a critical or abnormal lab result, we will notify you by phone as soon as possible.  Submit refill requests through [employer] or call your pharmacy and they will forward the refill request to us. Please allow 3 business days for your refill to be completed.          Additional Information About Your Visit        MyChart Information     [employer] gives you secure access to your electronic health record. If you see a primary care provider, you can also send messages to your care team and make appointments. If you have questions, please call your primary care clinic.  If you do not have a primary care provider, please call [phone] and they will assist  you.        Care EveryWhere ID     This is your Care EveryWhere ID. This could be used by other organizations to access your [place] medical records  [id number]        Your Vitals Were     Pulse Temperature BMI (Body Mass Index)             70 98.6  F (37  C) (Oral) 27.84 kg/m2          Blood Pressure from Last 3 Encounters:   09/12/18 135/80   07/19/18 126/88   04/20/18 125/80    Weight from Last 3 Encounters:   09/12/18 194 lb (88 kg)   07/19/18 190 lb (86.2 kg)   04/20/18 196 lb (88.9 kg)              We Performed the Following     PNEUMOCOCCAL CONJ VACCINE 13 VALENT IM     VACCINE ADMINISTRATION, INITIAL        Primary Care Provider Office Phone # Fax #    Quang Morgan [phone]       [address] [facility] [address]        Equal Access to Services     [facility]: Hadii fransisco taveras hadasho Soomaali, waaxda luqadaha, qaybta kaalmada adezulyyada, moncho sumner . So [facility] [phone].    ATENCIÓN: Si habla español, tiene a howard disposición servicios gratuitos de asistencia lingüística. Llame al [phone].    We comply with applicable federal civil rights laws and [place] laws. We do not discriminate on the basis of race, color, national origin, age, disability, sex, sexual orientation, or gender identity.            Thank you!     Thank you for choosing [facility]  for your care. Our goal is always to provide you with excellent care. Hearing back from our patients is one way we can continue to improve our services. Please take a few minutes to complete the written survey that you may receive in the mail after your visit with us. Thank you!             Your Updated Medication List - Protect others around you: Learn how to safely use, store and throw away your medicines at www.disposemymeds.org.          This list is accurate as of 9/12/18  8:42 AM.  Always use your most recent med list.                   Brand Name Dispense  Instructions for use Diagnosis    amoxicillin 500 MG capsule    AMOXIL    12 capsule    Before dental appointments take 4 pills one hour prior    Prophylactic antibiotic       aspirin 325 MG EC tablet      1 TABLET DAILY        calcium carbonate 500 MG chewable tablet    TUMS     Take 1 chew tab by mouth daily as needed        lisinopril 2.5 MG tablet    PRINIVIL/Zestril    90 tablet    TAKE 1 TABLET (2.5 MG) BY MOUTH DAILY    Hypertension goal BP (blood pressure) < 140/90       metoprolol tartrate 25 MG tablet    LOPRESSOR    90 tablet    Take 0.5 tablets (12.5 mg) by mouth 2 times daily    Hypertension goal BP (blood pressure) < 140/90

## 2018-09-13 ENCOUNTER — RADIANT APPOINTMENT (OUTPATIENT)
Dept: GENERAL RADIOLOGY | Facility: CLINIC | Age: 63
End: 2018-09-13
Attending: FAMILY MEDICINE
Payer: COMMERCIAL

## 2018-09-13 DIAGNOSIS — M25.561 RIGHT KNEE PAIN, UNSPECIFIED CHRONICITY: ICD-10-CM

## 2018-09-13 PROCEDURE — 73560 X-RAY EXAM OF KNEE 1 OR 2: CPT | Mod: RT

## 2018-10-09 ENCOUNTER — OFFICE VISIT (OUTPATIENT)
Dept: ORTHOPEDICS | Facility: CLINIC | Age: 63
End: 2018-10-09
Payer: COMMERCIAL

## 2018-10-09 VITALS — OXYGEN SATURATION: 96 % | HEART RATE: 104 BPM | DIASTOLIC BLOOD PRESSURE: 84 MMHG | SYSTOLIC BLOOD PRESSURE: 142 MMHG

## 2018-10-09 DIAGNOSIS — M17.11 PRIMARY OSTEOARTHRITIS OF RIGHT KNEE: Primary | ICD-10-CM

## 2018-10-09 PROCEDURE — 99204 OFFICE O/P NEW MOD 45 MIN: CPT | Performed by: ORTHOPAEDIC SURGERY

## 2018-10-09 ASSESSMENT — PAIN SCALES - GENERAL: PAINLEVEL: MODERATE PAIN (4)

## 2018-10-09 NOTE — PATIENT INSTRUCTIONS
Non-surgical treatment for knee arthritis includes:    * rest.  For acute flares. (Periodic).    * Activity modification - avoid impact activities or activities that aggravate symptoms.   Keeping joints moving may be the most important aspect of joint health.    * Tylenol as needed for pain, consider Tylenol arthritis or similar.  (no more than 3000mg of acetaminophen in a 24 hour period)    * NSAIDS (non-steroidal anti-inflammatory medications; e.g. Aleve, advil, motrin, ibuprofen, etc) - regular use for inflammation ( twice daily or three times daily), with food, as long as there are no contra-indications to using these medications. (if you have stomach ulcers, reflux, or kidney dysfunction, you should talk to your primary care provider to discuss whether these medications are right for your).  Please discuss other concerns with your primary care doctor if needed.    *Glucosamine-Chondroitin (1500 mg per day. Available over the counter)  has not been proven to help arthritis, yet some patients claim that it does help them with their arthritis pain.    * ice, 15-20 minutes at a time several times a day or as needed if there is swelling, or after an acute injury.  * Strengthening of quadriceps muscles  * Physical Therapy for strengthening, stretching and range of motion exercises of legs    * Weight loss: Your body mass index is 27.84 kg/m2. A healthy BMI is below 25.  Weight loss benefits, not only the current pain symptoms, but also overall health. Recommend a good diet plan that works for the patient, with the assistance of a dietician or primary care doctor as needed. Also, a good, low-impact exercise program for at least 20 minutes per day, 3 times per week, such as exercise bike, elliptical , or pool.    *DIET:  Although there is no set diet that will eliminate/cure arthritis, there are some foods that may help inflammation, which is the cause of the pain in arthritis, such as concentrated cherry juice,  "Tumeric, and Fish Oil.      Check out the Arthritis Foundation website for further diet suggestions to potentially reduce inflammation and pain:    http://www.arthritis.org/living-with-arthritis/arthritis-diet/best-foods-for-arthritis/    * Exercise: low impact such as stationary bike, elliptical, pool.  Some Knee exercises can be found at the Orthoinfo website:  https://orthoinfo.aaos.org/en/staying-healthy/knee-exercises/     * Injections: cortisone, versus viscosupplementation (hyaluronic acid, \"rooster comb\", \"gel shots\")  * Bracing: bracing the knee may offer some relief of symptoms when worn and provide some stability.  These can be over the counter, or custom-made \"\" braces that take the pressure off of the worn portion of the knee.  * over the counter supplements such as glucosamine-chondroitin sulfate may help with joint pain.  * topical ointments may help as well with pain    *Accupuncture may be helpful to control the pain.    *Surgical options include arthroscopy, osteotomy (correcting the alignment of the bone by cutting it), biologic resurfacing, cadaver partial transplant, partial or total knee replacement.  This should be discussed with Dr. Andre.  "

## 2018-10-09 NOTE — LETTER
10/9/2018         RE: Marlo Cottrell  [address] [place] [address]        Dear Colleague,    Thank you for referring your patient, Marlo Cottrell, to the [facility]. Please see a copy of my visit note below.    SUBJECTIVE:   Marlo Cottrell is a 63 year old male who is seen in consultation at the request of Dr. Quang Morgan for evaluation of right knee pain that has been present approximately 3 months. No known injury.    Present symptoms: clicking, anteromedial knee pain, and swelling. No catching, locking, or giving way.     Symptoms occur when: kneeling and going down stairs    Treatments tried to this point: NSAIDs and knee sleeve    Orthopedic PMH: No history of knee problems in the past.     Review of Systems:  Constitutional:  NEGATIVE for fever, chills, change in weight  Integumentary/Skin:  NEGATIVE for worrisome rashes, moles or lesions  Eyes:  NEGATIVE for vision changes or irritation  ENT/Mouth:  NEGATIVE for ear, mouth and throat problems  Resp:  NEGATIVE for significant cough or SOB  Breast:  NEGATIVE for masses, tenderness or discharge  CV:  NEGATIVE for chest pain, palpitations or peripheral edema  GI:  NEGATIVE for nausea, abdominal pain, heartburn, or change in bowel habits  :  Negative   Musculoskeletal:  See HPI above  Neuro:  NEGATIVE for weakness, dizziness or paresthesias  Endocrine:  NEGATIVE for temperature intolerance, skin/hair changes  Heme/allergy/immune:  NEGATIVE for bleeding problems  Psychiatric:  NEGATIVE for changes in mood or affect    Past Medical History:   Past Medical History:   Diagnosis Date     Aortic stenosis      Chronic infection     hepatitis C     Difficulty in walking(719.7)      Hepatitis C      Hyperlipidemia      Walking troubles      Past Surgical History:   Past Surgical History:   Procedure Laterality Date     APPENDECTOMY  1967     IRRIGATION AND DEBRIDEMENT CHEST WASHOUT, COMBINED  6/28/2011    Procedure:COMBINED IRRIGATION AND  DEBRIDEMENT CHEST WASHOUT; Medialstinal washout and closure.; Surgeon:VICKI IQBAL; Location[facility]     REPAIR VALVE AORTIC  2011    Procedure:REPAIR VALVE AORTIC; Median Sternotomy, Aortic Valve Replacement, Ascending   Aortic Aneurysm Repair and On Pump Oxygenator. ; Surgeon:VICKI IQBAL; Location[facility] OR     Family History:   Family History   Problem Relation Age of Onset     Cancer Mother      lung  68     Alzheimer Disease Paternal Grandfather      Cancer Brother      maybe lung 50ish     Other - See Comments Father      asphyxiation     Diabetes No family hx of      C.A.D. No family hx of      Cerebrovascular Disease No family hx of      Social History:   Social History   Substance Use Topics     Smoking status: Former Smoker     Types: Cigarettes     Quit date: 2005     Smokeless tobacco: Never Used      Comment: non-smoking household     Alcohol use No      Comment: none since      OBJECTIVE:  Physical Exam:  /84 (BP Location: Left arm, Patient Position: Sitting, Cuff Size: Adult Regular)  Pulse 104  SpO2 96%  General Appearance: healthy, alert and no distress   Skin: no suspicious lesions or rashes  Neuro: Normal strength and tone, mentation intact and speech normal  Vascular: good pulses, and cappillary refill   Lymph: no lymphadenopathy   Psych:  mentation appears normal and affect normal/bright  Resp: no increased work of breathing     Right Knee Exam:  Gait: walks with normal gait  Alignment: normal, pes planus  Squat: 60% limited by anterior knee pain.    Patellofemoral joint: significant crepitations in the patellofemoral joint.  Effusion: mild  ROM: 0-130* degrees   Tender: medial joint line  Ligaments:   Lachman's: stable   Anterior/Posterior drawer: stable,   Varus/Valgus stress: stable to varus and valgus stress  Quads: Appears to have mild quad atrophy on the right    X-rays:  Obtained 18 of the standing RIGHT KNEE: 2-views, reviewed in the office with the patient by  myself today and show significant medial joint space narrowing, patella nilesh, and suspected degenerative changes in the patellofemoral joint. Not quite bone-on-bone apposition medially. No sunrise view available.      ASSESSMENT:   OA mainly involving the medial compartment and patellofemoral joint, right knee.  Pain is only annoying, and he says he can easily live with the present discomfort.    PLAN:   We talked about the options:  brace, steroid injection, viscosupplementation injection, and surgery. The patient declined the  brace because the knee sleeve he currently wears at work helps his pain. I think his pes planus is somewhat protective as well. We talked about strengthening exercises the patient can do and other activity modifications. If and when he returns, he should have a sunrise view x-ray. If the patient is experiencing more pain, he will consider further intervention.     Return to clinic: ELIZABETH Andre MD  Dept. Orthopedic Surgery  [facility]     This document serves as a record of the services and decisions personally performed and made by Dr. ALEXA Andre MD. It was created on his behalf by Dandy Madrigal, a trained medical scribe. The creation of this record is based on the provider's personal observations and the statements of the patient. This document has been checked and approved by the attending provider.   Dandy Madrigal October 9, 2018 4:54 PM     Again, thank you for allowing me to participate in the care of your patient.        Sincerely,        Wilfredo Andre MD

## 2018-10-09 NOTE — MR AVS SNAPSHOT
After Visit Summary   10/9/2018    Marlo Cottrell    MRN: [id number]           Patient Information     Date Of Birth          1955        Visit Information        Provider Department      10/9/2018 4:00 PM Wilfredo Andre MD [facility] Instructions    Non-surgical treatment for knee arthritis includes:    * rest.  For acute flares. (Periodic).    * Activity modification - avoid impact activities or activities that aggravate symptoms.   Keeping joints moving may be the most important aspect of joint health.    * Tylenol as needed for pain, consider Tylenol arthritis or similar.  (no more than 3000mg of acetaminophen in a 24 hour period)    * NSAIDS (non-steroidal anti-inflammatory medications; e.g. Aleve, advil, motrin, ibuprofen, etc) - regular use for inflammation ( twice daily or three times daily), with food, as long as there are no contra-indications to using these medications. (if you have stomach ulcers, reflux, or kidney dysfunction, you should talk to your primary care provider to discuss whether these medications are right for your).  Please discuss other concerns with your primary care doctor if needed.    *Glucosamine-Chondroitin (1500 mg per day. Available over the counter)  has not been proven to help arthritis, yet some patients claim that it does help them with their arthritis pain.    * ice, 15-20 minutes at a time several times a day or as needed if there is swelling, or after an acute injury.  * Strengthening of quadriceps muscles  * Physical Therapy for strengthening, stretching and range of motion exercises of legs    * Weight loss: Your body mass index is 27.84 kg/m2. A healthy BMI is below 25.  Weight loss benefits, not only the current pain symptoms, but also overall health. Recommend a good diet plan that works for the patient, with the assistance of a dietician or primary care doctor as needed. Also, a good, low-impact exercise program for at  "least 20 minutes per day, 3 times per week, such as exercise bike, elliptical , or pool.    *DIET:  Although there is no set diet that will eliminate/cure arthritis, there are some foods that may help inflammation, which is the cause of the pain in arthritis, such as concentrated cherry juice, Tumeric, and Fish Oil.      Check out the Arthritis Foundation website for further diet suggestions to potentially reduce inflammation and pain:    http://www.arthritis.org/living-with-arthritis/arthritis-diet/best-foods-for-arthritis/    * Exercise: low impact such as stationary bike, elliptical, pool.  Some Knee exercises can be found at the Orthoinfo website:  https://orthoinfo.aaos.org/en/staying-healthy/knee-exercises/     * Injections: cortisone, versus viscosupplementation (hyaluronic acid, \"rooster comb\", \"gel shots\")  * Bracing: bracing the knee may offer some relief of symptoms when worn and provide some stability.  These can be over the counter, or custom-made \"\" braces that take the pressure off of the worn portion of the knee.  * over the counter supplements such as glucosamine-chondroitin sulfate may help with joint pain.  * topical ointments may help as well with pain    *Accupuncture may be helpful to control the pain.    *Surgical options include arthroscopy, osteotomy (correcting the alignment of the bone by cutting it), biologic resurfacing, cadaver partial transplant, partial or total knee replacement.  This should be discussed with Dr. Andre.          Follow-ups after your visit        Who to contact     If you have questions or need follow up information about today's clinic visit or your schedule please contact [facility] directly at [phone].  Normal or non-critical lab and imaging results will be communicated to you by MyChart, letter or phone within 4 business days after the clinic has received the results. If you do not hear from us within 7 days, please contact the " clinic through [facility] or phone. If you have a critical or abnormal lab result, we will notify you by phone as soon as possible.  Submit refill requests through [employer] or call your pharmacy and they will forward the refill request to us. Please allow 3 business days for your refill to be completed.          Additional Information About Your Visit        [employer] Information     [facility] gives you secure access to your electronic health record. If you see a primary care provider, you can also send messages to your care team and make appointments. If you have questions, please call your primary care clinic.  If you do not have a primary care provider, please call [phone] and they will assist you.        Care EveryWhere ID     This is your Care EveryWhere ID. This could be used by other organizations to access your [place] medical records  [id number]        Your Vitals Were     Pulse Pulse Oximetry                104 96%           Blood Pressure from Last 3 Encounters:   10/09/18 142/84   09/12/18 135/80   07/19/18 126/88    Weight from Last 3 Encounters:   09/12/18 88 kg (194 lb)   07/19/18 86.2 kg (190 lb)   04/20/18 88.9 kg (196 lb)              Today, you had the following     No orders found for display       Primary Care Provider Office Phone # Fax #    Quang Morgan [phone]       [address] [facility] [address]        Equal Access to Services     [facility] : Hadii aad ku hadasho Soomaali, waaxda luqadaha, qaybta kaalmada ivetteda, moncho pickett. So [facility] [phone].    ATENCIÓN: Si habla español, tiene a howard disposición servicios gratuitos de asistencia lingüística. Lina al [phone].    We comply with applicable federal civil rights laws and [place] laws. We do not discriminate on the basis of race, color, national origin, age, disability, sex, sexual orientation, or gender identity.            Thank you!     Thank you for  choosing [facility]  for your care. Our goal is always to provide you with excellent care. Hearing back from our patients is one way we can continue to improve our services. Please take a few minutes to complete the written survey that you may receive in the mail after your visit with us. Thank you!             Your Updated Medication List - Protect others around you: Learn how to safely use, store and throw away your medicines at www.disposemymeds.org.          This list is accurate as of 10/9/18  4:17 PM.  Always use your most recent med list.                   Brand Name Dispense Instructions for use Diagnosis    amoxicillin 500 MG capsule    AMOXIL    12 capsule    Before dental appointments take 4 pills one hour prior    Prophylactic antibiotic       aspirin 325 MG EC tablet      1 TABLET DAILY        calcium carbonate 500 MG chewable tablet    TUMS     Take 1 chew tab by mouth daily as needed        lisinopril 2.5 MG tablet    PRINIVIL/Zestril    90 tablet    TAKE 1 TABLET (2.5 MG) BY MOUTH DAILY    Hypertension goal BP (blood pressure) < 140/90       metoprolol tartrate 25 MG tablet    LOPRESSOR    90 tablet    Take 0.5 tablets (12.5 mg) by mouth 2 times daily    Hypertension goal BP (blood pressure) < 140/90

## 2018-10-09 NOTE — PROGRESS NOTES
SUBJECTIVE:   Marlo Cottrell is a 63 year old male who is seen in consultation at the request of Dr. Quang Morgan for evaluation of right knee pain that has been present approximately 3 months. No known injury.    Present symptoms: clicking, anteromedial knee pain, and swelling. No catching, locking, or giving way.     Symptoms occur when: kneeling and going down stairs    Treatments tried to this point: NSAIDs and knee sleeve    Orthopedic PMH: No history of knee problems in the past.     Review of Systems:  Constitutional:  NEGATIVE for fever, chills, change in weight  Integumentary/Skin:  NEGATIVE for worrisome rashes, moles or lesions  Eyes:  NEGATIVE for vision changes or irritation  ENT/Mouth:  NEGATIVE for ear, mouth and throat problems  Resp:  NEGATIVE for significant cough or SOB  Breast:  NEGATIVE for masses, tenderness or discharge  CV:  NEGATIVE for chest pain, palpitations or peripheral edema  GI:  NEGATIVE for nausea, abdominal pain, heartburn, or change in bowel habits  :  Negative   Musculoskeletal:  See HPI above  Neuro:  NEGATIVE for weakness, dizziness or paresthesias  Endocrine:  NEGATIVE for temperature intolerance, skin/hair changes  Heme/allergy/immune:  NEGATIVE for bleeding problems  Psychiatric:  NEGATIVE for changes in mood or affect    Past Medical History:   Past Medical History:   Diagnosis Date     Aortic stenosis      Chronic infection     hepatitis C     Difficulty in walking(719.7)      Hepatitis C      Hyperlipidemia      Walking troubles      Past Surgical History:   Past Surgical History:   Procedure Laterality Date     APPENDECTOMY  1967     IRRIGATION AND DEBRIDEMENT CHEST WASHOUT, COMBINED  6/28/2011    Procedure:COMBINED IRRIGATION AND DEBRIDEMENT CHEST WASHOUT; Medialstinal washout and closure.; Surgeon:VICKI IQBAL; Location:[facility]     REPAIR VALVE AORTIC  6/27/2011    Procedure:REPAIR VALVE AORTIC; Median Sternotomy, Aortic Valve Replacement, Ascending   Aortic Aneurysm  Repair and On Pump Oxygenator. ; Surgeon:VICKI IQBAL; Location:[facility] OR     Family History:   Family History   Problem Relation Age of Onset     Cancer Mother      lung  68     Alzheimer Disease Paternal Grandfather      Cancer Brother      maybe lung 50ish     Other - See Comments Father      asphyxiation     Diabetes No family hx of      C.A.D. No family hx of      Cerebrovascular Disease No family hx of      Social History:   Social History   Substance Use Topics     Smoking status: Former Smoker     Types: Cigarettes     Quit date: 2005     Smokeless tobacco: Never Used      Comment: non-smoking household     Alcohol use No      Comment: none since      OBJECTIVE:  Physical Exam:  /84 (BP Location: Left arm, Patient Position: Sitting, Cuff Size: Adult Regular)  Pulse 104  SpO2 96%  General Appearance: healthy, alert and no distress   Skin: no suspicious lesions or rashes  Neuro: Normal strength and tone, mentation intact and speech normal  Vascular: good pulses, and cappillary refill   Lymph: no lymphadenopathy   Psych:  mentation appears normal and affect normal/bright  Resp: no increased work of breathing     Right Knee Exam:  Gait: walks with normal gait  Alignment: normal, pes planus  Squat: 60% limited by anterior knee pain.    Patellofemoral joint: significant crepitations in the patellofemoral joint.  Effusion: mild  ROM: 0-130* degrees   Tender: medial joint line  Ligaments:   Lachman's: stable   Anterior/Posterior drawer: stable,   Varus/Valgus stress: stable to varus and valgus stress  Quads: Appears to have mild quad atrophy on the right    X-rays:  Obtained 18 of the standing RIGHT KNEE: 2-views, reviewed in the office with the patient by myself today and show significant medial joint space narrowing, patella nilesh, and suspected degenerative changes in the patellofemoral joint. Not quite bone-on-bone apposition medially. No sunrise view available.      ASSESSMENT:   OA mainly  involving the medial compartment and patellofemoral joint, right knee.  Pain is only annoying, and he says he can easily live with the present discomfort.    PLAN:   We talked about the options:  brace, steroid injection, viscosupplementation injection, and surgery. The patient declined the  brace because the knee sleeve he currently wears at work helps his pain. I think his pes planus is somewhat protective as well. We talked about strengthening exercises the patient can do and other activity modifications. If and when he returns, he should have a sunrise view x-ray. If the patient is experiencing more pain, he will consider further intervention.     Return to clinic: ELIZABETH Andre MD  Dept. Orthopedic Surgery  [facility]     This document serves as a record of the services and decisions personally performed and made by Dr. ALEXA Andre MD. It was created on his behalf by Dandy Madrigal, a trained medical scribe. The creation of this record is based on the provider's personal observations and the statements of the patient. This document has been checked and approved by the attending provider.   Dandy Madrigal October 9, 2018 4:54 PM

## 2018-10-29 ENCOUNTER — MYC MEDICAL ADVICE (OUTPATIENT)
Dept: FAMILY MEDICINE | Facility: CLINIC | Age: 63
End: 2018-10-29

## 2018-11-05 NOTE — TELEPHONE ENCOUNTER
I pulled flu shot information from [facility] and updated it.  Hillary Orta CMA

## 2019-06-21 ASSESSMENT — ENCOUNTER SYMPTOMS
ARTHRALGIAS: 0
PARESTHESIAS: 0
NERVOUS/ANXIOUS: 0
SHORTNESS OF BREATH: 0
HEARTBURN: 0
COUGH: 0
FREQUENCY: 0
PALPITATIONS: 0
NAUSEA: 0
CHILLS: 0
DIARRHEA: 0
HEMATURIA: 0
HEADACHES: 0
DYSURIA: 0
CONSTIPATION: 0
DIZZINESS: 0
HEMATOCHEZIA: 0
JOINT SWELLING: 0
SORE THROAT: 0
WEAKNESS: 0
FEVER: 0
MYALGIAS: 0
EYE PAIN: 0
ABDOMINAL PAIN: 0

## 2019-06-21 ASSESSMENT — ACTIVITIES OF DAILY LIVING (ADL): CURRENT_FUNCTION: NO ASSISTANCE NEEDED

## 2019-06-28 ENCOUNTER — OFFICE VISIT (OUTPATIENT)
Dept: FAMILY MEDICINE | Facility: CLINIC | Age: 64
End: 2019-06-28
Payer: COMMERCIAL

## 2019-06-28 VITALS
WEIGHT: 191.25 LBS | BODY MASS INDEX: 28.33 KG/M2 | HEIGHT: 69 IN | HEART RATE: 92 BPM | SYSTOLIC BLOOD PRESSURE: 128 MMHG | TEMPERATURE: 98.1 F | DIASTOLIC BLOOD PRESSURE: 78 MMHG

## 2019-06-28 DIAGNOSIS — E78.5 HYPERLIPIDEMIA LDL GOAL <100: ICD-10-CM

## 2019-06-28 DIAGNOSIS — R73.01 IMPAIRED FASTING GLUCOSE: ICD-10-CM

## 2019-06-28 DIAGNOSIS — R53.83 FATIGUE, UNSPECIFIED TYPE: ICD-10-CM

## 2019-06-28 DIAGNOSIS — I10 HYPERTENSION GOAL BP (BLOOD PRESSURE) < 140/90: ICD-10-CM

## 2019-06-28 DIAGNOSIS — Z12.5 SCREENING FOR PROSTATE CANCER: ICD-10-CM

## 2019-06-28 DIAGNOSIS — Z12.11 SCREEN FOR COLON CANCER: ICD-10-CM

## 2019-06-28 DIAGNOSIS — Z86.19 HISTORY OF HEPATITIS C: ICD-10-CM

## 2019-06-28 DIAGNOSIS — Z00.00 ENCOUNTER FOR PREVENTATIVE ADULT HEALTH CARE EXAMINATION: Primary | ICD-10-CM

## 2019-06-28 LAB
ALBUMIN SERPL-MCNC: 3.7 G/DL (ref 3.4–5)
ALP SERPL-CCNC: 62 U/L (ref 40–150)
ALT SERPL W P-5'-P-CCNC: 16 U/L (ref 0–70)
ANION GAP SERPL CALCULATED.3IONS-SCNC: 6 MMOL/L (ref 3–14)
AST SERPL W P-5'-P-CCNC: 16 U/L (ref 0–45)
BASOPHILS # BLD AUTO: 0 10E9/L (ref 0–0.2)
BASOPHILS NFR BLD AUTO: 0.4 %
BILIRUB SERPL-MCNC: 0.3 MG/DL (ref 0.2–1.3)
BUN SERPL-MCNC: 18 MG/DL (ref 7–30)
CALCIUM SERPL-MCNC: 8.9 MG/DL (ref 8.5–10.1)
CHLORIDE SERPL-SCNC: 110 MMOL/L (ref 94–109)
CHOLEST SERPL-MCNC: 149 MG/DL
CO2 SERPL-SCNC: 23 MMOL/L (ref 20–32)
CREAT SERPL-MCNC: 0.85 MG/DL (ref 0.66–1.25)
DIFFERENTIAL METHOD BLD: ABNORMAL
EOSINOPHIL # BLD AUTO: 0.1 10E9/L (ref 0–0.7)
EOSINOPHIL NFR BLD AUTO: 1.4 %
ERYTHROCYTE [DISTWIDTH] IN BLOOD BY AUTOMATED COUNT: 13.5 % (ref 10–15)
GFR SERPL CREATININE-BSD FRML MDRD: >90 ML/MIN/{1.73_M2}
GLUCOSE SERPL-MCNC: 129 MG/DL (ref 70–99)
HBA1C MFR BLD: 5.5 % (ref 0–5.6)
HCT VFR BLD AUTO: 40.7 % (ref 40–53)
HDLC SERPL-MCNC: 32 MG/DL
HGB BLD-MCNC: 13.5 G/DL (ref 13.3–17.7)
LDLC SERPL CALC-MCNC: 107 MG/DL
LYMPHOCYTES # BLD AUTO: 1.4 10E9/L (ref 0.8–5.3)
LYMPHOCYTES NFR BLD AUTO: 17.7 %
MCH RBC QN AUTO: 31.8 PG (ref 26.5–33)
MCHC RBC AUTO-ENTMCNC: 33.2 G/DL (ref 31.5–36.5)
MCV RBC AUTO: 96 FL (ref 78–100)
MONOCYTES # BLD AUTO: 0.7 10E9/L (ref 0–1.3)
MONOCYTES NFR BLD AUTO: 8.4 %
NEUTROPHILS # BLD AUTO: 5.6 10E9/L (ref 1.6–8.3)
NEUTROPHILS NFR BLD AUTO: 72.1 %
NONHDLC SERPL-MCNC: 117 MG/DL
PLATELET # BLD AUTO: 230 10E9/L (ref 150–450)
POTASSIUM SERPL-SCNC: 4.7 MMOL/L (ref 3.4–5.3)
PROT SERPL-MCNC: 7.4 G/DL (ref 6.8–8.8)
PSA SERPL-ACNC: 0.35 UG/L (ref 0–4)
RBC # BLD AUTO: 4.24 10E12/L (ref 4.4–5.9)
SODIUM SERPL-SCNC: 139 MMOL/L (ref 133–144)
TRIGL SERPL-MCNC: 50 MG/DL
TSH SERPL DL<=0.005 MIU/L-ACNC: 2.02 MU/L (ref 0.4–4)
WBC # BLD AUTO: 7.8 10E9/L (ref 4–11)

## 2019-06-28 PROCEDURE — G0103 PSA SCREENING: HCPCS | Performed by: FAMILY MEDICINE

## 2019-06-28 PROCEDURE — 85025 COMPLETE CBC W/AUTO DIFF WBC: CPT | Performed by: FAMILY MEDICINE

## 2019-06-28 PROCEDURE — 83036 HEMOGLOBIN GLYCOSYLATED A1C: CPT | Performed by: FAMILY MEDICINE

## 2019-06-28 PROCEDURE — 99397 PER PM REEVAL EST PAT 65+ YR: CPT | Performed by: FAMILY MEDICINE

## 2019-06-28 PROCEDURE — 87522 HEPATITIS C REVRS TRNSCRPJ: CPT | Performed by: FAMILY MEDICINE

## 2019-06-28 PROCEDURE — 80061 LIPID PANEL: CPT | Performed by: FAMILY MEDICINE

## 2019-06-28 PROCEDURE — 80053 COMPREHEN METABOLIC PANEL: CPT | Performed by: FAMILY MEDICINE

## 2019-06-28 PROCEDURE — 36415 COLL VENOUS BLD VENIPUNCTURE: CPT | Performed by: FAMILY MEDICINE

## 2019-06-28 PROCEDURE — 84443 ASSAY THYROID STIM HORMONE: CPT | Performed by: FAMILY MEDICINE

## 2019-06-28 RX ORDER — LISINOPRIL 2.5 MG/1
2.5 TABLET ORAL DAILY
Qty: 90 TABLET | Refills: 3 | Status: SHIPPED | OUTPATIENT
Start: 2019-06-28

## 2019-06-28 RX ORDER — METOPROLOL TARTRATE 25 MG/1
12.5 TABLET, FILM COATED ORAL 2 TIMES DAILY
Qty: 90 TABLET | Refills: 3 | Status: SHIPPED | OUTPATIENT
Start: 2019-06-28 | End: 2020-02-16

## 2019-06-28 RX ORDER — SODIUM PHOSPHATE,MONO-DIBASIC 19G-7G/118
1 ENEMA (ML) RECTAL DAILY
COMMUNITY

## 2019-06-28 ASSESSMENT — ENCOUNTER SYMPTOMS
FEVER: 0
CHILLS: 0
PARESTHESIAS: 0
HEARTBURN: 0
EYE PAIN: 0
ABDOMINAL PAIN: 0
DIARRHEA: 0
WEAKNESS: 0
NERVOUS/ANXIOUS: 0
PALPITATIONS: 0
HEMATURIA: 0
MYALGIAS: 0
DYSURIA: 0
ARTHRALGIAS: 0
CONSTIPATION: 0
JOINT SWELLING: 0
NAUSEA: 0
DIZZINESS: 0
HEADACHES: 0
COUGH: 0
HEMATOCHEZIA: 0
SORE THROAT: 0
SHORTNESS OF BREATH: 0
FREQUENCY: 0

## 2019-06-28 ASSESSMENT — MIFFLIN-ST. JEOR: SCORE: 1644.37

## 2019-06-28 ASSESSMENT — PAIN SCALES - GENERAL: PAINLEVEL: NO PAIN (0)

## 2019-06-28 ASSESSMENT — ACTIVITIES OF DAILY LIVING (ADL): CURRENT_FUNCTION: NO ASSISTANCE NEEDED

## 2019-06-28 NOTE — PROGRESS NOTES
"SUBJECTIVE:   CC: Marlo Cottrell is an 63 year old male who presents for preventative health visit.     Healthy Habits:     In general, how would you rate your overall health?  Excellent    Frequency of exercise:  2-3 days/week    Duration of exercise:  15-30 minutes    Do you usually eat at least 4 servings of fruit and vegetables a day, include whole grains    & fiber and avoid regularly eating high fat or \"junk\" foods?  Yes    Taking medications regularly:  Yes    Medication side effects:  Not applicable    Ability to successfully perform activities of daily living:  No assistance needed    Home Safety:  No safety concerns identified    Hearing Impairment:  No hearing concerns    In the past 6 months, have you been bothered by leaking of urine?  No    In general, how would you rate your overall mental or emotional health?  Excellent      PHQ-2 Total Score: 0    Additional concerns today:  No          none    Today's PHQ-2 Score:   PHQ-2 (  [employer]) 2019   Q1: Little interest or pleasure in doing things 0   Q2: Feeling down, depressed or hopeless 0   PHQ-2 Score 0   Q1: Little interest or pleasure in doing things Not at all   Q2: Feeling down, depressed or hopeless Not at all   PHQ-2 Score 0       Abuse: Current or Past(Physical, Sexual or Emotional)- No  Do you feel safe in your environment? Yes    Social History     Tobacco Use     Smoking status: Former Smoker     Types: Cigarettes     Last attempt to quit: 2005     Years since quittin.4     Smokeless tobacco: Never Used     Tobacco comment: non-smoking household   Substance Use Topics     Alcohol use: No     Comment: none since      If you drink alcohol do you typically have >3 drinks per day or >7 drinks per week? Not applicable     No flowsheet data found.    Last PSA:   PSA   Date Value Ref Range Status   2018 0.36 0 - 4 ug/L Final     Comment:     Assay Method:  Chemiluminescence using Siemens Vista analyzer       Reviewed orders " "with patient. Reviewed health maintenance and updated orders accordingly - Yes       Reviewed and updated as needed this visit by clinical staff  Tobacco  Allergies  Meds  Med Hx  Surg Hx  Fam Hx  Soc Hx        Reviewed and updated as needed this visit by Provider            Review of Systems   Constitutional: Negative for chills and fever.   HENT: Negative for congestion, ear pain, hearing loss and sore throat.    Eyes: Negative for pain and visual disturbance.   Respiratory: Negative for cough and shortness of breath.    Cardiovascular: Negative for chest pain, palpitations and peripheral edema.   Gastrointestinal: Negative for abdominal pain, constipation, diarrhea, heartburn, hematochezia and nausea.   Genitourinary: Negative for discharge, dysuria, frequency, genital sores, hematuria, impotence and urgency.   Musculoskeletal: Negative for arthralgias, joint swelling and myalgias.   Skin: Negative for rash.   Neurological: Negative for dizziness, weakness, headaches and paresthesias.   Psychiatric/Behavioral: Negative for mood changes. The patient is not nervous/anxious.       retire early August  Plan to sell house   Move to [place]  Feels well     Glucosamine chrondroitin    Sleep well    Walks a lot at work    OBJECTIVE:   /82 (BP Location: Left arm, Patient Position: Chair, Cuff Size: Adult Regular)   Pulse 92   Temp 98.1  F (36.7  C) (Oral)   Ht 1.755 m (5' 9.09\")   Wt 86.8 kg (191 lb 4 oz)   BMI 28.17 kg/m      Physical Exam  GENERAL: healthy, alert and no distress  EYES: Eyes grossly normal to inspection, PERRL and conjunctivae and sclerae normal  HENT: ear canals and TM's normal, nose and mouth without ulcers or lesions  NECK: no adenopathy, no asymmetry, masses, or scars and thyroid normal to palpation  RESP: lungs clear to auscultation - no rales, rhonchi or wheezes  CV: regular rate and rhythm, normal S1 S2, no S3 or S4, no murmur, click or rub, no peripheral edema and peripheral " pulses strong  ABDOMEN: soft, nontender, no hepatosplenomegaly, no masses and bowel sounds normal   (male): normal male genitalia without lesions or urethral discharge, no hernia  MS: no gross musculoskeletal defects noted, no edema  SKIN: no suspicious lesions or rashes  NEURO: Normal strength and tone, mentation intact and speech normal  PSYCH: mentation appears normal, affect normal/bright    Diagnostic Test Results:  Labs reviewed in Epic    ASSESSMENT/PLAN:   Marlo was seen today for physical and health maintenance.    Diagnoses and all orders for this visit:    Encounter for preventative adult health care examination    Screen for colon cancer  -     Fecal colorectal cancer screen (FIT); Future    Hypertension goal BP (blood pressure) < 140/90  -     lisinopril (PRINIVIL/ZESTRIL) 2.5 MG tablet; Take 1 tablet (2.5 mg) by mouth daily  -     metoprolol tartrate (LOPRESSOR) 25 MG tablet; Take 0.5 tablets (12.5 mg) by mouth 2 times daily    Hyperlipidemia LDL goal <100  -     Lipid panel reflex to direct LDL Fasting  -     Comprehensive metabolic panel    History of hepatitis C  -     Hepatitis C RNA, quantitative    Fatigue, unspecified type  -     TSH with free T4 reflex  -     CBC with platelets differential    Impaired fasting glucose  -     Hemoglobin A1c    Screening for prostate cancer  -     Prostate spec antigen screen    Discussed multiple issues with patient  Overall stable  On recheck blood pressure fine  Refill meds  Check labs   Keep working on healthy diet/exercise and wt loss  Patient to move to [place] soon  Fit test    COUNSELING:   Reviewed preventive health counseling, as reflected in patient instructions       Regular exercise       Healthy diet/nutrition       Vision screening       Safe sex practices/STD prevention       Colon cancer screening       Prostate cancer screening    Estimated body mass index is 28.17 kg/m  as calculated from the following:    Height as of this encounter: 1.755 m  "(5' 9.09\").    Weight as of this encounter: 86.8 kg (191 lb 4 oz).     Weight management plan: Discussed healthy diet and exercise guidelines     reports that he quit smoking about 14 years ago. His smoking use included cigarettes. He has never used smokeless tobacco.      Counseling Resources:  ATP IV Guidelines  Pooled Cohorts Equation Calculator  FRAX Risk Assessment  ICSI Preventive Guidelines  Dietary Guidelines for Americans, 2010  USDA's MyPlate  ASA Prophylaxis  Lung CA Screening    Quang Morgan MD  [facility]  "

## 2019-06-28 NOTE — PATIENT INSTRUCTIONS
Keep working on healthy diet/exercise and wt loss    Stay on same medications    Return the stool FIT test    We will send you lab results

## 2019-06-29 PROCEDURE — 82274 ASSAY TEST FOR BLOOD FECAL: CPT | Performed by: FAMILY MEDICINE

## 2019-06-30 LAB
HCV RNA SERPL NAA+PROBE-ACNC: NORMAL [IU]/ML
HCV RNA SERPL NAA+PROBE-LOG IU: NORMAL LOG IU/ML

## 2019-06-30 NOTE — RESULT ENCOUNTER NOTE
We are still waiting for the hepatitis C test result.    The other labs are all okay.    The normal hemoglobin a1c means you do not have diabetes.    Quang Morgan MD

## 2019-07-02 DIAGNOSIS — Z12.11 SCREEN FOR COLON CANCER: ICD-10-CM

## 2019-07-02 LAB — HEMOCCULT STL QL IA: NEGATIVE

## 2020-02-13 DIAGNOSIS — I10 HYPERTENSION GOAL BP (BLOOD PRESSURE) < 140/90: ICD-10-CM

## 2020-02-13 RX ORDER — METOPROLOL TARTRATE 25 MG/1
12.5 TABLET, FILM COATED ORAL 2 TIMES DAILY
Qty: 90 TABLET | Refills: 3 | OUTPATIENT
Start: 2020-02-13

## 2020-02-13 NOTE — TELEPHONE ENCOUNTER
90 day supply with 3 refill sent 6/28/19. Refill request too soon. Refused.     Lexie Ulloa, RN, BSN, PHN  [facility]: [place]

## 2020-02-16 ENCOUNTER — MYC REFILL (OUTPATIENT)
Dept: FAMILY MEDICINE | Facility: CLINIC | Age: 65
End: 2020-02-16

## 2020-02-16 DIAGNOSIS — I10 HYPERTENSION GOAL BP (BLOOD PRESSURE) < 140/90: ICD-10-CM

## 2020-02-18 RX ORDER — METOPROLOL TARTRATE 25 MG/1
12.5 TABLET, FILM COATED ORAL 2 TIMES DAILY
Qty: 90 TABLET | Refills: 3 | Status: SHIPPED | OUTPATIENT
Start: 2020-02-18

## 2020-02-18 NOTE — TELEPHONE ENCOUNTER
"Requested Prescriptions   Pending Prescriptions Disp Refills     metoprolol tartrate 25 MG PO tablet 90 tablet 3     Sig: Take 0.5 tablets (12.5 mg) by mouth 2 times daily       Beta-Blockers Protocol Passed - 2/16/2020  5:33 PM        Passed - Blood pressure under 140/90 in past 12 months     BP Readings from Last 3 Encounters:   06/28/19 128/78   10/09/18 142/84   09/12/18 135/80                 Passed - Patient is age 6 or older        Passed - Recent (12 mo) or future (30 days) visit within the authorizing provider's specialty     Patient has had an office visit with the authorizing provider or a provider within the authorizing providers department within the previous 12 mos or has a future within next 30 days. See \"Patient Info\" tab in inbasket, or \"Choose Columns\" in Meds & Orders section of the refill encounter.              Passed - Medication is active on med list        patient in [place] at this time    Future Office Visit:    Prescription approved per [facility] Refill Protocol.    Safia Coates RN  [facility]      "

## 2020-08-07 ENCOUNTER — TELEPHONE (OUTPATIENT)
Dept: CARDIOLOGY | Facility: CLINIC | Age: 65
End: 2020-08-07

## 2020-08-07 NOTE — TELEPHONE ENCOUNTER
" Health Call Center    Phone Message    May a detailed message be left on voicemail: yes     Reason for Call: pt also reporting that there are major hospitals in [place], [place], the health industry is call \"[facility]\" (just like our [facility] in the [facility].  [facility] has many facilities in [place], [facility] [place] [facility], and [place]. Pt is asking Dr. Norris to help him think about who to see or any help about moving his heart care forward in [place].  Pt was due for a heart f/u with Dr. Norris on 8/20/20 in [place], but pt had to cancel this due to being in [place], now. Please call pt asap to help him with this request. Pt has seen Dr. Norris for 15 years at least. Thank you!      Action Taken: Message routed to:  [facility]    Travel Screening: Not Applicable                                                                      "

## 2020-08-07 NOTE — LETTER
September 15, 2020      TO: Marlo Cottrell  [id number] [address]         Dear Marlo,    I am writing in follow up from your request to Dr Norris recommendations for cardiologist in [place], [place].    Dr Norris did not know any specific physician to recommend, but we did notice  [facility] has many cardiologists/specialists. It may be convenient and a possible choice.      Good luck!      Sincerely,    Siomara Atkinson RN

## 2020-08-11 NOTE — TELEPHONE ENCOUNTER
There is a heart and vascular clinic in [place] ([facility])  With many cardiologists listed.   msg sent to Dr Santoyo. For recommendations

## 2020-08-13 NOTE — TELEPHONE ENCOUNTER
Spoke to pt. No hurry. Just need to know if Dr Santoyo has any recommendations-  Needs to make an appt in the next few months.     Will review when he is in clinic next week and follow up.   Pt verbalized understanding

## 2020-09-15 NOTE — TELEPHONE ENCOUNTER
Left pt message.   Dr Norris does not know of specific cardiologist to follow up with.   msg left for pt , letter sent in follow up

## 2020-12-12 ENCOUNTER — HEALTH MAINTENANCE LETTER (OUTPATIENT)
Age: 65
End: 2020-12-12

## 2021-09-26 ENCOUNTER — HEALTH MAINTENANCE LETTER (OUTPATIENT)
Age: 66
End: 2021-09-26

## 2022-01-16 ENCOUNTER — HEALTH MAINTENANCE LETTER (OUTPATIENT)
Age: 67
End: 2022-01-16

## 2023-01-08 ENCOUNTER — HEALTH MAINTENANCE LETTER (OUTPATIENT)
Age: 68
End: 2023-01-08

## 2023-04-23 ENCOUNTER — HEALTH MAINTENANCE LETTER (OUTPATIENT)
Age: 68
End: 2023-04-23

## 2024-01-18 NOTE — TELEPHONE ENCOUNTER
"Requested Prescriptions   Pending Prescriptions Disp Refills     metoprolol tartrate (LOPRESSOR) 25 MG tablet 90 tablet 3     Sig: Take 0.5 tablets (12.5 mg) by mouth 2 times daily   Last Written Prescription Date:  6-28-19  Last Fill Quantity: 90,  # refills: 3   Last office visit: 6/28/2019 with prescribing provider:  6-28-19   Future Office Visit:        Beta-Blockers Protocol Passed - 2/13/2020  8:12 AM        Passed - Blood pressure under 140/90 in past 12 months     BP Readings from Last 3 Encounters:   06/28/19 128/78   10/09/18 142/84   09/12/18 135/80                 Passed - Patient is age 6 or older        Passed - Recent (12 mo) or future (30 days) visit within the authorizing provider's specialty     Patient has had an office visit with the authorizing provider or a provider within the authorizing providers department within the previous 12 mos or has a future within next 30 days. See \"Patient Info\" tab in inbasket, or \"Choose Columns\" in Meds & Orders section of the refill encounter.              Passed - Medication is active on med list          " Admission